# Patient Record
Sex: FEMALE | Race: WHITE | Employment: OTHER | URBAN - METROPOLITAN AREA
[De-identification: names, ages, dates, MRNs, and addresses within clinical notes are randomized per-mention and may not be internally consistent; named-entity substitution may affect disease eponyms.]

---

## 2017-01-25 ENCOUNTER — APPOINTMENT (OUTPATIENT)
Dept: GENERAL RADIOLOGY | Age: 82
DRG: 871 | End: 2017-01-25
Attending: EMERGENCY MEDICINE
Payer: MEDICARE

## 2017-01-25 ENCOUNTER — HOSPITAL ENCOUNTER (INPATIENT)
Age: 82
LOS: 8 days | Discharge: HOSPICE | DRG: 871 | End: 2017-02-02
Attending: EMERGENCY MEDICINE | Admitting: HOSPITALIST
Payer: MEDICARE

## 2017-01-25 DIAGNOSIS — J96.00 ACUTE RESPIRATORY FAILURE, UNSPECIFIED WHETHER WITH HYPOXIA OR HYPERCAPNIA (HCC): ICD-10-CM

## 2017-01-25 DIAGNOSIS — R53.81 DEBILITY: ICD-10-CM

## 2017-01-25 DIAGNOSIS — R78.81 BACTEREMIA: ICD-10-CM

## 2017-01-25 DIAGNOSIS — R06.00 DYSPNEA, UNSPECIFIED TYPE: ICD-10-CM

## 2017-01-25 DIAGNOSIS — A41.9 SEPSIS, DUE TO UNSPECIFIED ORGANISM: ICD-10-CM

## 2017-01-25 DIAGNOSIS — J18.9 PNEUMONIA OF RIGHT LOWER LOBE DUE TO INFECTIOUS ORGANISM: Primary | ICD-10-CM

## 2017-01-25 DIAGNOSIS — N39.0 URINARY TRACT INFECTION WITHOUT HEMATURIA, SITE UNSPECIFIED: ICD-10-CM

## 2017-01-25 DIAGNOSIS — J44.1 COPD EXACERBATION (HCC): ICD-10-CM

## 2017-01-25 PROBLEM — R06.03 RESPIRATORY DISTRESS: Status: ACTIVE | Noted: 2017-01-25

## 2017-01-25 LAB
ALBUMIN SERPL BCP-MCNC: 2.9 G/DL (ref 3.4–5)
ALBUMIN/GLOB SERPL: 0.8 {RATIO} (ref 0.8–1.7)
ALP SERPL-CCNC: 58 U/L (ref 45–117)
ALT SERPL-CCNC: 17 U/L (ref 13–56)
ANION GAP BLD CALC-SCNC: 10 MMOL/L (ref 3–18)
APPEARANCE UR: ABNORMAL
APTT PPP: 20.3 SEC (ref 23–36.4)
AST SERPL W P-5'-P-CCNC: 15 U/L (ref 15–37)
BACTERIA URNS QL MICRO: ABNORMAL /HPF
BASOPHILS # BLD AUTO: 0 K/UL (ref 0–0.06)
BASOPHILS # BLD: 0 % (ref 0–2)
BILIRUB SERPL-MCNC: 0.6 MG/DL (ref 0.2–1)
BILIRUB UR QL: NEGATIVE
BNP SERPL-MCNC: ABNORMAL PG/ML (ref 0–1800)
BUN SERPL-MCNC: 32 MG/DL (ref 7–18)
BUN/CREAT SERPL: 21 (ref 12–20)
CALCIUM SERPL-MCNC: 9.1 MG/DL (ref 8.5–10.1)
CHLORIDE SERPL-SCNC: 102 MMOL/L (ref 100–108)
CK MB CFR SERPL CALC: ABNORMAL % (ref 0–4)
CK MB SERPL-MCNC: <0.5 NG/ML (ref 0.5–3.6)
CK SERPL-CCNC: 40 U/L (ref 26–192)
CO2 SERPL-SCNC: 25 MMOL/L (ref 21–32)
COLOR UR: ABNORMAL
CREAT SERPL-MCNC: 1.55 MG/DL (ref 0.6–1.3)
DIFFERENTIAL METHOD BLD: ABNORMAL
EOSINOPHIL # BLD: 0.1 K/UL (ref 0–0.4)
EOSINOPHIL NFR BLD: 0 % (ref 0–5)
EPITH CASTS URNS QL MICRO: ABNORMAL /LPF (ref 0–5)
ERYTHROCYTE [DISTWIDTH] IN BLOOD BY AUTOMATED COUNT: 13.8 % (ref 11.6–14.5)
EST. AVERAGE GLUCOSE BLD GHB EST-MCNC: 123 MG/DL
GLOBULIN SER CALC-MCNC: 3.7 G/DL (ref 2–4)
GLUCOSE BLD STRIP.AUTO-MCNC: 130 MG/DL (ref 70–110)
GLUCOSE BLD STRIP.AUTO-MCNC: 131 MG/DL (ref 70–110)
GLUCOSE SERPL-MCNC: 144 MG/DL (ref 74–99)
GLUCOSE UR STRIP.AUTO-MCNC: NEGATIVE MG/DL
HBA1C MFR BLD: 5.9 % (ref 4.5–5.6)
HCT VFR BLD AUTO: 40.2 % (ref 35–45)
HGB BLD-MCNC: 13.2 G/DL (ref 12–16)
HGB UR QL STRIP: ABNORMAL
INR PPP: 1.3 (ref 0.8–1.2)
KETONES UR QL STRIP.AUTO: NEGATIVE MG/DL
LACTATE SERPL-SCNC: 1.3 MMOL/L (ref 0.4–2)
LACTATE SERPL-SCNC: 2.1 MMOL/L (ref 0.4–2)
LEUKOCYTE ESTERASE UR QL STRIP.AUTO: ABNORMAL
LYMPHOCYTES # BLD AUTO: 10 % (ref 21–52)
LYMPHOCYTES # BLD: 1.7 K/UL (ref 0.9–3.6)
MCH RBC QN AUTO: 30.1 PG (ref 24–34)
MCHC RBC AUTO-ENTMCNC: 32.8 G/DL (ref 31–37)
MCV RBC AUTO: 91.8 FL (ref 74–97)
MONOCYTES # BLD: 1.2 K/UL (ref 0.05–1.2)
MONOCYTES NFR BLD AUTO: 7 % (ref 3–10)
NEUTS SEG # BLD: 14.3 K/UL (ref 1.8–8)
NEUTS SEG NFR BLD AUTO: 83 % (ref 40–73)
NITRITE UR QL STRIP.AUTO: NEGATIVE
PH UR STRIP: 5 [PH] (ref 5–8)
PLATELET # BLD AUTO: 343 K/UL (ref 135–420)
PMV BLD AUTO: 9.9 FL (ref 9.2–11.8)
POTASSIUM SERPL-SCNC: 4.2 MMOL/L (ref 3.5–5.5)
PROT SERPL-MCNC: 6.6 G/DL (ref 6.4–8.2)
PROT UR STRIP-MCNC: 30 MG/DL
PROTHROMBIN TIME: 15.9 SEC (ref 11.5–15.2)
RBC # BLD AUTO: 4.38 M/UL (ref 4.2–5.3)
RBC #/AREA URNS HPF: 0 /HPF (ref 0–5)
SODIUM SERPL-SCNC: 137 MMOL/L (ref 136–145)
SP GR UR REFRACTOMETRY: 1.02 (ref 1–1.03)
TROPONIN I SERPL-MCNC: <0.02 NG/ML (ref 0–0.06)
UROBILINOGEN UR QL STRIP.AUTO: 0.2 EU/DL (ref 0.2–1)
WBC # BLD AUTO: 17.2 K/UL (ref 4.6–13.2)
WBC URNS QL MICRO: ABNORMAL /HPF (ref 0–5)

## 2017-01-25 PROCEDURE — 83605 ASSAY OF LACTIC ACID: CPT | Performed by: EMERGENCY MEDICINE

## 2017-01-25 PROCEDURE — 96375 TX/PRO/DX INJ NEW DRUG ADDON: CPT

## 2017-01-25 PROCEDURE — 94762 N-INVAS EAR/PLS OXIMTRY CONT: CPT

## 2017-01-25 PROCEDURE — 96361 HYDRATE IV INFUSION ADD-ON: CPT

## 2017-01-25 PROCEDURE — 77030013140 HC MSK NEB VYRM -A

## 2017-01-25 PROCEDURE — 99285 EMERGENCY DEPT VISIT HI MDM: CPT

## 2017-01-25 PROCEDURE — 82550 ASSAY OF CK (CPK): CPT | Performed by: EMERGENCY MEDICINE

## 2017-01-25 PROCEDURE — 74011000258 HC RX REV CODE- 258: Performed by: HOSPITALIST

## 2017-01-25 PROCEDURE — 82962 GLUCOSE BLOOD TEST: CPT

## 2017-01-25 PROCEDURE — 74011250636 HC RX REV CODE- 250/636: Performed by: HOSPITALIST

## 2017-01-25 PROCEDURE — 85025 COMPLETE CBC W/AUTO DIFF WBC: CPT | Performed by: EMERGENCY MEDICINE

## 2017-01-25 PROCEDURE — 74011250636 HC RX REV CODE- 250/636: Performed by: EMERGENCY MEDICINE

## 2017-01-25 PROCEDURE — 80053 COMPREHEN METABOLIC PANEL: CPT | Performed by: EMERGENCY MEDICINE

## 2017-01-25 PROCEDURE — 65270000029 HC RM PRIVATE

## 2017-01-25 PROCEDURE — 87040 BLOOD CULTURE FOR BACTERIA: CPT | Performed by: EMERGENCY MEDICINE

## 2017-01-25 PROCEDURE — 87077 CULTURE AEROBIC IDENTIFY: CPT | Performed by: EMERGENCY MEDICINE

## 2017-01-25 PROCEDURE — 83880 ASSAY OF NATRIURETIC PEPTIDE: CPT | Performed by: EMERGENCY MEDICINE

## 2017-01-25 PROCEDURE — 94640 AIRWAY INHALATION TREATMENT: CPT

## 2017-01-25 PROCEDURE — 77010033678 HC OXYGEN DAILY

## 2017-01-25 PROCEDURE — 74011000258 HC RX REV CODE- 258: Performed by: EMERGENCY MEDICINE

## 2017-01-25 PROCEDURE — 96365 THER/PROPH/DIAG IV INF INIT: CPT

## 2017-01-25 PROCEDURE — 85730 THROMBOPLASTIN TIME PARTIAL: CPT | Performed by: EMERGENCY MEDICINE

## 2017-01-25 PROCEDURE — 83036 HEMOGLOBIN GLYCOSYLATED A1C: CPT | Performed by: HOSPITALIST

## 2017-01-25 PROCEDURE — 74011250637 HC RX REV CODE- 250/637

## 2017-01-25 PROCEDURE — 71010 XR CHEST PORT: CPT

## 2017-01-25 PROCEDURE — 85610 PROTHROMBIN TIME: CPT | Performed by: EMERGENCY MEDICINE

## 2017-01-25 PROCEDURE — 93005 ELECTROCARDIOGRAM TRACING: CPT

## 2017-01-25 PROCEDURE — 74011000250 HC RX REV CODE- 250: Performed by: EMERGENCY MEDICINE

## 2017-01-25 PROCEDURE — 74011000250 HC RX REV CODE- 250: Performed by: HOSPITALIST

## 2017-01-25 PROCEDURE — 87186 SC STD MICRODIL/AGAR DIL: CPT | Performed by: EMERGENCY MEDICINE

## 2017-01-25 PROCEDURE — 81001 URINALYSIS AUTO W/SCOPE: CPT | Performed by: EMERGENCY MEDICINE

## 2017-01-25 PROCEDURE — 74011250637 HC RX REV CODE- 250/637: Performed by: FAMILY MEDICINE

## 2017-01-25 RX ORDER — SODIUM CHLORIDE 50 MG/ML
1 SOLUTION/ DROPS OPHTHALMIC 2 TIMES DAILY
Status: DISCONTINUED | OUTPATIENT
Start: 2017-01-25 | End: 2017-01-25

## 2017-01-25 RX ORDER — HEPARIN SODIUM 5000 [USP'U]/ML
5000 INJECTION, SOLUTION INTRAVENOUS; SUBCUTANEOUS EVERY 8 HOURS
Status: DISCONTINUED | OUTPATIENT
Start: 2017-01-25 | End: 2017-01-30

## 2017-01-25 RX ORDER — ESCITALOPRAM OXALATE 10 MG/1
10 TABLET ORAL DAILY
Status: DISCONTINUED | OUTPATIENT
Start: 2017-01-26 | End: 2017-02-02 | Stop reason: HOSPADM

## 2017-01-25 RX ORDER — LOTEPREDNOL ETABONATE 5 MG/G
1 GEL OPHTHALMIC DAILY
Status: DISCONTINUED | OUTPATIENT
Start: 2017-01-26 | End: 2017-01-28

## 2017-01-25 RX ORDER — ONDANSETRON 2 MG/ML
4 INJECTION INTRAMUSCULAR; INTRAVENOUS
Status: COMPLETED | OUTPATIENT
Start: 2017-01-25 | End: 2017-01-25

## 2017-01-25 RX ORDER — AMOXICILLIN 250 MG
1 CAPSULE ORAL EVERY OTHER DAY
COMMUNITY

## 2017-01-25 RX ORDER — SODIUM CHLORIDE 0.9 % (FLUSH) 0.9 %
5-10 SYRINGE (ML) INJECTION AS NEEDED
Status: DISCONTINUED | OUTPATIENT
Start: 2017-01-25 | End: 2017-02-02 | Stop reason: HOSPADM

## 2017-01-25 RX ORDER — SODIUM CHLORIDE 50 MG/ML
1 SOLUTION/ DROPS OPHTHALMIC 2 TIMES DAILY
Status: DISCONTINUED | OUTPATIENT
Start: 2017-01-25 | End: 2017-02-02 | Stop reason: HOSPADM

## 2017-01-25 RX ORDER — DIFLUPREDNATE 0.5 MG/ML
1 EMULSION OPHTHALMIC
COMMUNITY

## 2017-01-25 RX ORDER — ASPIRIN 81 MG/1
81 TABLET ORAL DAILY
COMMUNITY
End: 2017-02-02

## 2017-01-25 RX ORDER — DIFLUPREDNATE 0.5 MG/ML
1 EMULSION OPHTHALMIC
Status: DISCONTINUED | OUTPATIENT
Start: 2017-01-31 | End: 2017-02-01 | Stop reason: ALTCHOICE

## 2017-01-25 RX ORDER — IPRATROPIUM BROMIDE AND ALBUTEROL SULFATE 2.5; .5 MG/3ML; MG/3ML
3 SOLUTION RESPIRATORY (INHALATION)
Status: DISCONTINUED | OUTPATIENT
Start: 2017-01-25 | End: 2017-02-02 | Stop reason: HOSPADM

## 2017-01-25 RX ORDER — MAGNESIUM SULFATE 100 %
4 CRYSTALS MISCELLANEOUS AS NEEDED
Status: DISCONTINUED | OUTPATIENT
Start: 2017-01-25 | End: 2017-01-30

## 2017-01-25 RX ORDER — OLOPATADINE HYDROCHLORIDE 2 MG/ML
1 SOLUTION/ DROPS OPHTHALMIC DAILY
COMMUNITY
End: 2017-02-02

## 2017-01-25 RX ORDER — INSULIN LISPRO 100 [IU]/ML
INJECTION, SOLUTION INTRAVENOUS; SUBCUTANEOUS
Status: DISCONTINUED | OUTPATIENT
Start: 2017-01-25 | End: 2017-01-30

## 2017-01-25 RX ORDER — CARVEDILOL 12.5 MG/1
12.5 TABLET ORAL 2 TIMES DAILY WITH MEALS
Status: DISCONTINUED | OUTPATIENT
Start: 2017-01-25 | End: 2017-02-02 | Stop reason: HOSPADM

## 2017-01-25 RX ORDER — DOXYLAMINE SUCCINATE 25 MG
TABLET ORAL 2 TIMES DAILY
COMMUNITY

## 2017-01-25 RX ORDER — IPRATROPIUM BROMIDE AND ALBUTEROL SULFATE 2.5; .5 MG/3ML; MG/3ML
3 SOLUTION RESPIRATORY (INHALATION)
Status: DISCONTINUED | OUTPATIENT
Start: 2017-01-25 | End: 2017-01-26 | Stop reason: SDUPTHER

## 2017-01-25 RX ORDER — FLUOCINONIDE TOPICAL SOLUTION USP, 0.05% 0.5 MG/ML
SOLUTION TOPICAL 2 TIMES DAILY
COMMUNITY

## 2017-01-25 RX ORDER — ACETAMINOPHEN 650 MG/1
650 SUPPOSITORY RECTAL
Status: COMPLETED | OUTPATIENT
Start: 2017-01-25 | End: 2017-01-25

## 2017-01-25 RX ORDER — DIFLUPREDNATE 0.5 MG/ML
1 EMULSION OPHTHALMIC 3 TIMES DAILY
Status: DISCONTINUED | OUTPATIENT
Start: 2017-01-25 | End: 2017-02-02 | Stop reason: HOSPADM

## 2017-01-25 RX ORDER — CHOLECALCIFEROL (VITAMIN D3) 125 MCG
2000 CAPSULE ORAL DAILY
COMMUNITY
End: 2017-02-02

## 2017-01-25 RX ORDER — ACETAMINOPHEN 325 MG/1
650 TABLET ORAL
Status: DISCONTINUED | OUTPATIENT
Start: 2017-01-25 | End: 2017-02-02 | Stop reason: HOSPADM

## 2017-01-25 RX ORDER — IPRATROPIUM BROMIDE AND ALBUTEROL SULFATE 2.5; .5 MG/3ML; MG/3ML
3 SOLUTION RESPIRATORY (INHALATION) ONCE
Status: COMPLETED | OUTPATIENT
Start: 2017-01-25 | End: 2017-01-25

## 2017-01-25 RX ORDER — BUSPIRONE HYDROCHLORIDE 15 MG/1
15 TABLET ORAL 2 TIMES DAILY
COMMUNITY

## 2017-01-25 RX ORDER — DEXTROSE 50 % IN WATER (D50W) INTRAVENOUS SYRINGE
25-50 AS NEEDED
Status: DISCONTINUED | OUTPATIENT
Start: 2017-01-25 | End: 2017-01-30

## 2017-01-25 RX ORDER — LOTEPREDNOL ETABONATE 5 MG/G
1 GEL OPHTHALMIC DAILY
COMMUNITY

## 2017-01-25 RX ORDER — CHOLECALCIFEROL (VITAMIN D3) 125 MCG
5 CAPSULE ORAL
COMMUNITY

## 2017-01-25 RX ORDER — LEVOFLOXACIN 5 MG/ML
500 INJECTION, SOLUTION INTRAVENOUS EVERY 24 HOURS
Status: DISCONTINUED | OUTPATIENT
Start: 2017-01-25 | End: 2017-01-26 | Stop reason: DRUGHIGH

## 2017-01-25 RX ORDER — DIFLUPREDNATE 0.5 MG/ML
1 EMULSION OPHTHALMIC 3 TIMES DAILY
COMMUNITY

## 2017-01-25 RX ORDER — SODIUM CHLORIDE 9 MG/ML
40 INJECTION, SOLUTION INTRAVENOUS CONTINUOUS
Status: DISCONTINUED | OUTPATIENT
Start: 2017-01-25 | End: 2017-01-30

## 2017-01-25 RX ORDER — BENZONATATE 100 MG/1
100 CAPSULE ORAL
COMMUNITY
End: 2017-02-02

## 2017-01-25 RX ORDER — ACETAMINOPHEN 650 MG/1
SUPPOSITORY RECTAL
Status: COMPLETED
Start: 2017-01-25 | End: 2017-01-25

## 2017-01-25 RX ORDER — SODIUM CHLORIDE 0.9 % (FLUSH) 0.9 %
5-10 SYRINGE (ML) INJECTION EVERY 8 HOURS
Status: DISCONTINUED | OUTPATIENT
Start: 2017-01-25 | End: 2017-02-02 | Stop reason: HOSPADM

## 2017-01-25 RX ORDER — ESCITALOPRAM OXALATE 10 MG/1
10 TABLET ORAL DAILY
COMMUNITY
End: 2017-02-02

## 2017-01-25 RX ORDER — ASPIRIN 81 MG/1
81 TABLET ORAL DAILY
Status: DISCONTINUED | OUTPATIENT
Start: 2017-01-26 | End: 2017-02-02 | Stop reason: HOSPADM

## 2017-01-25 RX ORDER — PILOCARPINE HYDROCHLORIDE 10 MG/ML
1 SOLUTION/ DROPS OPHTHALMIC EVERY 6 HOURS
COMMUNITY
End: 2017-02-02

## 2017-01-25 RX ORDER — FLUTICASONE PROPIONATE 50 MCG
2 SPRAY, SUSPENSION (ML) NASAL DAILY
COMMUNITY
End: 2017-02-02

## 2017-01-25 RX ORDER — KETOCONAZOLE 20 MG/ML
SHAMPOO TOPICAL 2 TIMES WEEKLY
COMMUNITY
End: 2017-02-02

## 2017-01-25 RX ORDER — OLOPATADINE HYDROCHLORIDE 2 MG/ML
1 SOLUTION/ DROPS OPHTHALMIC DAILY
Status: DISCONTINUED | OUTPATIENT
Start: 2017-01-26 | End: 2017-02-01 | Stop reason: ALTCHOICE

## 2017-01-25 RX ORDER — BRINZOLAMIDE 10 MG/ML
1 SUSPENSION/ DROPS OPHTHALMIC 3 TIMES DAILY
Status: DISCONTINUED | OUTPATIENT
Start: 2017-01-25 | End: 2017-02-02 | Stop reason: HOSPADM

## 2017-01-25 RX ORDER — ATORVASTATIN CALCIUM 10 MG/1
10 TABLET, FILM COATED ORAL DAILY
Status: DISCONTINUED | OUTPATIENT
Start: 2017-01-26 | End: 2017-01-30

## 2017-01-25 RX ORDER — OLOPATADINE HYDROCHLORIDE 2 MG/ML
1 SOLUTION/ DROPS OPHTHALMIC DAILY
Status: DISCONTINUED | OUTPATIENT
Start: 2017-01-26 | End: 2017-01-25

## 2017-01-25 RX ORDER — PILOCARPINE HYDROCHLORIDE 10 MG/ML
1 SOLUTION/ DROPS OPHTHALMIC EVERY 6 HOURS
Status: DISCONTINUED | OUTPATIENT
Start: 2017-01-25 | End: 2017-02-01 | Stop reason: ALTCHOICE

## 2017-01-25 RX ORDER — SODIUM CHLORIDE 50 MG/ML
1 SOLUTION/ DROPS OPHTHALMIC 2 TIMES DAILY
COMMUNITY

## 2017-01-25 RX ADMIN — BRIMONIDINE TARTRATE 1 DROP: 1 SOLUTION/ DROPS OPHTHALMIC at 23:02

## 2017-01-25 RX ADMIN — ACETAMINOPHEN 650 MG: 650 SUPPOSITORY RECTAL at 09:36

## 2017-01-25 RX ADMIN — SODIUM CHLORIDE 1 DROP: 50 SOLUTION OPHTHALMIC at 23:00

## 2017-01-25 RX ADMIN — METHYLPREDNISOLONE SODIUM SUCCINATE 125 MG: 125 INJECTION, POWDER, FOR SOLUTION INTRAMUSCULAR; INTRAVENOUS at 09:43

## 2017-01-25 RX ADMIN — DIFLUPREDNATE 1 DROP: 0.5 EMULSION OPHTHALMIC at 22:00

## 2017-01-25 RX ADMIN — HEPARIN SODIUM 5000 UNITS: 5000 INJECTION, SOLUTION INTRAVENOUS; SUBCUTANEOUS at 17:23

## 2017-01-25 RX ADMIN — IPRATROPIUM BROMIDE AND ALBUTEROL SULFATE 3 ML: .5; 3 SOLUTION RESPIRATORY (INHALATION) at 10:26

## 2017-01-25 RX ADMIN — ONDANSETRON 4 MG: 2 INJECTION INTRAMUSCULAR; INTRAVENOUS at 09:43

## 2017-01-25 RX ADMIN — PIPERACILLIN AND TAZOBACTAM 3.38 G: 3; .375 INJECTION, POWDER, FOR SOLUTION INTRAVENOUS at 15:20

## 2017-01-25 RX ADMIN — Medication 10 ML: at 15:20

## 2017-01-25 RX ADMIN — SODIUM CHLORIDE 150 ML/HR: 900 INJECTION, SOLUTION INTRAVENOUS at 12:15

## 2017-01-25 RX ADMIN — SODIUM CHLORIDE 500 ML: 900 INJECTION, SOLUTION INTRAVENOUS at 12:14

## 2017-01-25 RX ADMIN — PIPERACILLIN AND TAZOBACTAM 3.38 G: 3; .375 INJECTION, POWDER, FOR SOLUTION INTRAVENOUS at 22:38

## 2017-01-25 RX ADMIN — BRINZOLAMIDE 1 DROP: 10 SUSPENSION/ DROPS OPHTHALMIC at 23:04

## 2017-01-25 RX ADMIN — BIMATOPROST 1 DROP: 0.1 SOLUTION/ DROPS OPHTHALMIC at 23:00

## 2017-01-25 RX ADMIN — CEFTRIAXONE 1 G: 1 INJECTION, POWDER, FOR SOLUTION INTRAMUSCULAR; INTRAVENOUS at 09:56

## 2017-01-25 RX ADMIN — METHYLPREDNISOLONE SODIUM SUCCINATE 40 MG: 40 INJECTION, POWDER, FOR SOLUTION INTRAMUSCULAR; INTRAVENOUS at 17:23

## 2017-01-25 RX ADMIN — LEVOFLOXACIN 500 MG: 5 INJECTION, SOLUTION INTRAVENOUS at 17:24

## 2017-01-25 RX ADMIN — Medication 5 ML: at 22:00

## 2017-01-25 RX ADMIN — ACETAMINOPHEN 650 MG: 325 TABLET, FILM COATED ORAL at 22:38

## 2017-01-25 RX ADMIN — SODIUM CHLORIDE 75 ML/HR: 900 INJECTION, SOLUTION INTRAVENOUS at 21:18

## 2017-01-25 RX ADMIN — Medication 10 ML: at 12:17

## 2017-01-25 NOTE — ED TRIAGE NOTES
Patient is a resident of [facility]. Patient transported to THE [facility] ED via [facility]. Patient was having shortness of breath has been diagnosed with pneumonia. EMS reports sats were 86% and placed on NC 4LPM via NC. Patient states she has a cough and is verbal and talking to MD.   Sepsis Screening completed    ( x )Patient meets SIRS criteria. (  )Patient does not meet SIRS criteria.       SIRS Criteria is achieved when two or more of the following are present   Temperature < 96.8°F (36°C) or > 100.9°F (38.3°C)   Heart Rate > 90 beats per minute   Respiratory Rate > 20 beats per minute   WBC count > 12,000 or <4,000 or > 10% bands

## 2017-01-25 NOTE — ED NOTES
Bedside report complete. Patient was introduced to oncsamantha Wilkins RN. Patient updated on plan of care. Safety check performed: Bed locked in low posiiton. Side rails up. Call bell and personal items within reach.

## 2017-01-25 NOTE — ED PROVIDER NOTES
HPI Comments: 9:12 AM  Marie Eng is a 80 y.o. female presenting to the ED with hx of COPD c/o SOB x 2 days. Per EMS, pt is from [facility], and PA there has dx pt with right lower lobe PNA. Pt has been given multiple breathing txs at her care facility with no relief. EMS reports there are multiple pt's at this care facility with diagnosed PNA. Pt normally uses O2 prn and was 86 % on room air. With nasal canula she is 96%. Pt reports associated sxs include leg pain, productive cough, chest pressure, and nausea. Pt reports she can stand and transfer to a chair, but is not ambulatory. Other PMHx include A-Fib, DM, CAD, and asthma. Denies any other sxs or complaints. Patient is a 80 y.o. female presenting with shortness of breath. The history is provided by the patient. Shortness of Breath   This is a new problem. The current episode started 2 days ago. Associated symptoms include cough, sputum production and chest pain. Pertinent negatives include no vomiting. She has tried inhaled steroids and ipratropium inhalers for the symptoms. The treatment provided no relief. Associated medical issues include asthma, COPD, pneumonia and CAD. Past Medical History:   Diagnosis Date    Adhesive capsulitis     Anemia     Anxiety     Arthritis     Asthma     Atrial fibrillation (HCC)     CAD (coronary artery disease)     COPD     Diabetes (HCC)     Diverticulosis     GERD (gastroesophageal reflux disease)     Hypertension     Osteoporosis     Peripheral neuropathy ([address]     Psychiatric disorder      depression    Thromboembolus ([address]     Unspecified sleep apnea      no longer wears CPAP.        Past Surgical History:   Procedure Laterality Date    Hx colectomy      Hx appendectomy      Hx gyn       hysterectomy    Hx heent       tonsillectomy    Hx hemorrhoidectomy      Hx corneal transplant           Family History:   Problem Relation Age of Onset    Heart Disease Mother     Cancer Father     Cancer Sister        Social History     Social History    Marital status:      Spouse name: N/A    Number of children: N/A    Years of education: N/A     Occupational History    Not on file. Social History Main Topics    Smoking status: Never Smoker    Smokeless tobacco: Never Used    Alcohol use No    Drug use: No    Sexual activity: Not on file     Other Topics Concern    Not on file     Social History Narrative         ALLERGIES: Latex and Codeine    Review of Systems   Respiratory: Positive for cough, sputum production and shortness of breath. Cardiovascular: Positive for chest pain. Gastrointestinal: Positive for nausea. Negative for vomiting. Musculoskeletal: Positive for myalgias. All other systems reviewed and are negative. Vitals:    01/25/17 1028 01/25/17 1100 01/25/17 1230 01/25/17 1240   BP:  98/62 103/51 97/47   Pulse:  63 91 (!) 109   Resp:  28 (!) 33 30   Temp:       SpO2: (!) 88% 95% 94% 95%   Weight:       Height:                Physical Exam   Constitutional: She is oriented to person, place, and time. She appears well-developed and well-nourished. She appears ill. No distress. Elderly female. Appears chronically ill. In NAD. HENT:   Head: Normocephalic and atraumatic. Mouth/Throat: Mucous membranes are dry. Dry with dry lips   Eyes: Pupils are equal, round, and reactive to light. Neck: Neck supple. Cardiovascular: Normal rate, regular rhythm, S1 normal, S2 normal and normal heart sounds. Pulmonary/Chest: No respiratory distress. She has wheezes (diffuse faint expiratory wheezes). She has rhonchi (mostly right sided). She exhibits no tenderness. Abdominal: Soft. She exhibits no distension and no mass. There is no tenderness. There is no guarding. Musculoskeletal: Normal range of motion. She exhibits no edema or tenderness.    Chronic joint changes of fingers and toes   Neurological: She is alert and oriented to person, place, and time. No cranial nerve deficit. CN II-XII intact     Skin: No rash noted. Psychiatric: She has a normal mood and affect. Her behavior is normal. Thought content normal.   Nursing note and vitals reviewed. RESULTS:    EKG interpretation: (Preliminary)  Rate 120 bpm. Electronic ventricular pacemaker. EKG read by Eric Zavala MD at 9:37 AM    10:28 AM  RADIOLOGY FINDINGS  Chest X-ray shows PNA in the right lower lobe  Pending review by Radiologist  Recorded by Anuj Cox ED Scribe, as dictated by Eric Zavala MD    XR CHEST PORT   Final Result   Impression:      1. Increasing consolidation right lower lobe with small right effusion  2. Scar in the left lung unchanged. 3. Patient's chin obscures the right apex but no pneumothorax. 4. Degenerative changes which appear severe involving both shoulders.     As read by the radiologist.          Labs Reviewed   CARDIAC PANEL,(CK, CKMB & TROPONIN) - Abnormal; Notable for the following:        Result Value    CK - MB <0.5 (*)     All other components within normal limits   CBC WITH AUTOMATED DIFF - Abnormal; Notable for the following:     WBC 17.2 (*)     NEUTROPHILS 83 (*)     LYMPHOCYTES 10 (*)     ABS.  NEUTROPHILS 14.3 (*)     All other components within normal limits   METABOLIC PANEL, COMPREHENSIVE - Abnormal; Notable for the following:     Glucose 144 (*)     BUN 32 (*)     Creatinine 1.55 (*)     BUN/Creatinine ratio 21 (*)     GFR est AA 38 (*)     GFR est non-AA 31 (*)     Albumin 2.9 (*)     All other components within normal limits   LACTIC ACID, PLASMA - Abnormal; Notable for the following:     Lactic acid 2.1 (*)     All other components within normal limits   PRO-BNP - Abnormal; Notable for the following:     NT pro-BNP 75242 (*)     All other components within normal limits   PROTHROMBIN TIME + INR - Abnormal; Notable for the following:     Prothrombin time 15.9 (*)     INR 1.3 (*)     All other components within normal limits PTT - Abnormal; Notable for the following:     aPTT 20.3 (*)     All other components within normal limits   URINALYSIS W/ RFLX MICROSCOPIC - Abnormal; Notable for the following:     Protein 30 (*)     Blood TRACE (*)     Leukocyte Esterase LARGE (*)     All other components within normal limits   URINE MICROSCOPIC ONLY - Abnormal; Notable for the following:     Bacteria 1+ (*)     All other components within normal limits   CULTURE, BLOOD       Recent Results (from the past 12 hour(s))   EKG, 12 LEAD, INITIAL    Collection Time: 01/25/17  9:37 AM   Result Value Ref Range    Ventricular Rate 120 BPM    Atrial Rate 122 BPM    QRS Duration 176 ms    Q-T Interval 426 ms    QTC Calculation (Bezet) 602 ms    Calculated R Axis -80 degrees    Calculated T Axis 89 degrees    Diagnosis       Electronic ventricular pacemaker  When compared with ECG of 21-AUG-2015 15:29,  Electronic ventricular pacemaker has replaced Sinus rhythm     CARDIAC PANEL,(CK, CKMB & TROPONIN)    Collection Time: 01/25/17  9:40 AM   Result Value Ref Range    CK 40 26 - 192 U/L    CK - MB <0.5 (L) 0.5 - 3.6 ng/ml    CK-MB Index CANNOT BE CALCULATED 0.0 - 4.0 %    Troponin-I, Qt. <0.02 0.00 - 0.06 NG/ML   CBC WITH AUTOMATED DIFF    Collection Time: 01/25/17  9:40 AM   Result Value Ref Range    WBC 17.2 (H) 4.6 - 13.2 K/uL    RBC 4.38 4.20 - 5.30 M/uL    HGB 13.2 12.0 - 16.0 g/dL    HCT 40.2 35.0 - 45.0 %    MCV 91.8 74.0 - 97.0 FL    MCH 30.1 24.0 - 34.0 PG    MCHC 32.8 31.0 - 37.0 g/dL    RDW 13.8 11.6 - 14.5 %    PLATELET 247 363 - 291 K/uL    MPV 9.9 9.2 - 11.8 FL    NEUTROPHILS 83 (H) 40 - 73 %    LYMPHOCYTES 10 (L) 21 - 52 %    MONOCYTES 7 3 - 10 %    EOSINOPHILS 0 0 - 5 %    BASOPHILS 0 0 - 2 %    ABS. NEUTROPHILS 14.3 (H) 1.8 - 8.0 K/UL    ABS. LYMPHOCYTES 1.7 0.9 - 3.6 K/UL    ABS. MONOCYTES 1.2 0.05 - 1.2 K/UL    ABS. EOSINOPHILS 0.1 0.0 - 0.4 K/UL    ABS.  BASOPHILS 0.0 0.0 - 0.06 K/UL    DF AUTOMATED     METABOLIC PANEL, COMPREHENSIVE Collection Time: 01/25/17  9:40 AM   Result Value Ref Range    Sodium 137 136 - 145 mmol/L    Potassium 4.2 3.5 - 5.5 mmol/L    Chloride 102 100 - 108 mmol/L    CO2 25 21 - 32 mmol/L    Anion gap 10 3.0 - 18 mmol/L    Glucose 144 (H) 74 - 99 mg/dL    BUN 32 (H) 7.0 - 18 MG/DL    Creatinine 1.55 (H) 0.6 - 1.3 MG/DL    BUN/Creatinine ratio 21 (H) 12 - 20      GFR est AA 38 (L) >60 ml/min/1.73m2    GFR est non-AA 31 (L) >60 ml/min/1.73m2    Calcium 9.1 8.5 - 10.1 MG/DL    Bilirubin, total 0.6 0.2 - 1.0 MG/DL    ALT 17 13 - 56 U/L    AST 15 15 - 37 U/L    Alk.  phosphatase 58 45 - 117 U/L    Protein, total 6.6 6.4 - 8.2 g/dL    Albumin 2.9 (L) 3.4 - 5.0 g/dL    Globulin 3.7 2.0 - 4.0 g/dL    A-G Ratio 0.8 0.8 - 1.7     LACTIC ACID, PLASMA    Collection Time: 01/25/17  9:40 AM   Result Value Ref Range    Lactic acid 2.1 (HH) 0.4 - 2.0 MMOL/L   PRO-BNP    Collection Time: 01/25/17  9:40 AM   Result Value Ref Range    NT pro-BNP 31762 (H) 0 - 1800 PG/ML   PROTHROMBIN TIME + INR    Collection Time: 01/25/17  9:40 AM   Result Value Ref Range    Prothrombin time 15.9 (H) 11.5 - 15.2 sec    INR 1.3 (H) 0.8 - 1.2     PTT    Collection Time: 01/25/17  9:40 AM   Result Value Ref Range    aPTT 20.3 (L) 23.0 - 36.4 SEC   URINALYSIS W/ RFLX MICROSCOPIC    Collection Time: 01/25/17  9:47 AM   Result Value Ref Range    Color DARK YELLOW      Appearance CLOUDY      Specific gravity 1.021 1.005 - 1.030      pH (UA) 5.0 5.0 - 8.0      Protein 30 (A) NEG mg/dL    Glucose NEGATIVE  NEG mg/dL    Ketone NEGATIVE  NEG mg/dL    Bilirubin NEGATIVE  NEG      Blood TRACE (A) NEG      Urobilinogen 0.2 0.2 - 1.0 EU/dL    Nitrites NEGATIVE  NEG      Leukocyte Esterase LARGE (A) NEG     URINE MICROSCOPIC ONLY    Collection Time: 01/25/17  9:47 AM   Result Value Ref Range    WBC 10 to 20 0 - 5 /hpf    RBC 0 0 - 5 /hpf    Epithelial cells FEW 0 - 5 /lpf    Bacteria 1+ (A) NEG /hpf       MDM  Number of Diagnoses or Management Options  Diagnosis management comments: INITIAL CLINICAL IMPRESSION and PLANS:  The patient presents with the primary complaint(s) of: SOB. The presentation, to include historical aspects and clinical findings are consistent with the DX of COPD Exacerbation. However, other possible DX's to consider as primary, associated with, or exacerbated by include:    1. PNA  2. UTI  3. Sepsis  4. Viral Illness  5. CHF    Considering the above, my initial management plan to evaluate and therapeutic interventions include the following and as noted in the orders:    1. Cardiac Panel, CBC, Blood culture, CMP, Lactic Acid, Pro-BNP, PT INR, PTT, UA  2. EKG  3. CXR    Recorded by Jenni Cárdenas ED Scribcielo, as dictated by Daniel Sharpe MD.         Amount and/or Complexity of Data Reviewed  Clinical lab tests: ordered and reviewed  Tests in the radiology section of CPT®: ordered and reviewed (CXR)  Tests in the medicine section of CPT®: ordered and reviewed (EKG)  Independent visualization of images, tracings, or specimens: yes (EKG, CXR)      ED Course     MEDICATIONS GIVEN:  Medications   sodium chloride (NS) flush 5-10 mL (10 mL IntraVENous Given 1/25/17 1217)   sodium chloride (NS) flush 5-10 mL (not administered)   0.9% sodium chloride infusion (150 mL/hr IntraVENous New Bag 1/25/17 1215)   methylPREDNISolone (PF) (SOLU-MEDROL) injection 125 mg (125 mg IntraVENous Given 1/25/17 0943)   ondansetron (ZOFRAN) injection 4 mg (4 mg IntraVENous Given 1/25/17 0943)   acetaminophen (TYLENOL) suppository 650 mg (650 mg Rectal Given 1/25/17 0936)   cefTRIAXone (ROCEPHIN) 1 g in 0.9% sodium chloride (MBP/ADV) 50 mL MBP (0 g IntraVENous IV Completed 1/25/17 1036)   albuterol-ipratropium (DUO-NEB) 2.5 MG-0.5 MG/3 ML (3 mL Nebulization Given 1/25/17 1026)   sodium chloride 0.9 % bolus infusion 500 mL (500 mL IntraVENous New Bag 1/25/17 1214)       Procedures    PROGRESS NOTE:  9:12 AM  Initial assessment performed.      PROGRESS NOTE:   11:45 AM  Discussed pt with her daughter who is at bedside. Daughter is aware pt has PNA, UTI, and sepsis. Pt was DNR on last admission to the hospital, I discussed this with daughter who advise continue DNR. No intubation, no aggressive measures. We also discussed possibility of septic measures including aggressive hydration with possibility of intubation if pt goes into failure and daughter was against this. She agrees with pt being admitted, getting abx, Oxygen, and any other comfort measures. Daughter is aware that there are no hospital beds and she denies transfer. SEPSIS ASSESSMENT NOTE:   12:01 PM  Dolores Fontenot MD has seen and assessed the patient as follows:    Capillary Refill:normal/brisk  Cardiopulmonary Evaluation:   Lung Sounds: wheezing and rhonchi   Cardiac Sounds: regular and rhythm ______  Peripheral Pulses: present  Skin Exam: pink and turgor poor    Visit Vitals    BP 97/47    Pulse (!) 109    Temp (!) 102.6 °F (39.2 °C)    Resp 30    Ht 5' 6\" (1.676 m)    Wt 80.7 kg (178 lb)    SpO2 95%    BMI 28.73 kg/m2       Written by DERIC Buck, as dictated by Molly Smith MD     CONSULT NOTE:   12:55 PM  Molly Smith MD spoke with Clover Griggs MD   Specialty: Internal Medicine, Hospitalist group  Discussed pt's hx, disposition, and available diagnostic and imaging results. Reviewed care plans. Consulting physician agrees to admit pt to medical floor. ADMISSION NOTE:  12:55 PM  Patient is being admitted to the hospital by Clover Griggs MD to the medical floor. The results of their tests and reasons for their admission have been discussed with them and/or available family. They convey agreement and understanding for the need to be admitted and for their admission diagnosis. CONDITIONS ON ADMISSION:  Deep Vein Thrombosis is not present at the time of admission. Thrombosis is not present at the time of admission.  Urinary Tract Infection is present at the time of admission. Pneumonia is present at the time of admission. MRSA is present at the time of admission. Wound infection is not present at the time of admission. Pressure Ulcer is not present at the time of admission. CLINICAL IMPRESSION    1. Pneumonia of right lower lobe due to infectious organism    2. Sepsis, due to unspecified organism ([facility] [address]    3. COPD exacerbation ([facility] [address]    4. Urinary tract infection without hematuria, site unspecified      12:55 PM  I have spent 45 minutes of critical care time involved in lab review, consultations with specialist, family decision-making, and documentation. During this entire length of time I was immediately available to the patient. Critical Care: The reason for providing this level of medical care for this critically ill patient was due a critical illness that impaired one or more vital organ systems such that there was a high probability of imminent or life threatening deterioration in the patients condition. This care involved high complexity decision making to assess, manipulate, and support vital system functions, to treat this degreee vital organ system failure and to prevent further life threatening deterioration of the patients condition. SCRIBE ATTESTATION STATEMENT  Documented by:Aga Veliz for and in the presence of Rosemary Cruz MD.     PROVIDER ATTESTATION STATEMENT  I personally performed the services described in the documentation, reviewed the documentation, as recorded by the scribe in my presence, and it accurately and completely records my words and actions.   Rosemary Cruz MD.

## 2017-01-25 NOTE — ED NOTES
Dr. Barbara Nath is aware of patient + for sirs and elevated lactic and he will look at chest xray to evaluate fluids.

## 2017-01-25 NOTE — PROGRESS NOTES
PTA eye drops taken to [facility] pharmacy. Administered Retaine MGD EMU 0.5-0.5% prior to taking meds to pharmacy. Glaucoma and cornea transplant bilateral. Right eye has increased pressure with glaucoma according to pt. Right eye has cornea transplant under physician supervision for potential problem.   [facility]  [phone] [facility])

## 2017-01-25 NOTE — ED NOTES
Patient coughing up thick yellow and gray secretions. Mouth was dry and crusty RNdid oral care and patient using yaunkeur to do own oral suctioning per her request. sats 87-90% on NC 4lpm Dr. Hdz Artist made aware continue with present plan of care.

## 2017-01-25 NOTE — ED NOTES
TRANSFER - OUT REPORT:    Verbal report given to Summer Arroyo) on [facility]  being transferred to medical 310(unit) for routine progression of care       Report consisted of patients Situation, Background, Assessment and   Recommendations(SBAR). Information from the following report(s) SBAR, ED Summary, [facility] and Recent Results was reviewed with the receiving nurse. Lines:   Peripheral IV 01/25/17 Left Antecubital (Active)       Peripheral IV 01/25/17 Left Antecubital (Active)   Site Assessment Clean, dry, & intact 1/25/2017  9:55 AM   Phlebitis Assessment 0 1/25/2017  9:55 AM   Infiltration Assessment 0 1/25/2017  9:55 AM   Dressing Status Clean, dry, & intact 1/25/2017  9:55 AM   Dressing Type Transparent 1/25/2017  9:55 AM        Opportunity for questions and clarification was provided.       Patient transported with:   [employer]

## 2017-01-25 NOTE — ED NOTES
Bedside report complete. Patient was introduced to maria alejandra Sewell RN. Patient updated on plan of care. Safety check performed: Bed locked in low posiiton. Side rails up. Call bell and personal items within reach. Handoff Sepsis Algorithm reviewed:    Time Blood Cultures Drawn      Time Initial Lactic Drawn:     Result:  Time Next Lactic Acid Due:  Time target fluid bolus was initiated:  Time antibiotics started:    Sepsis Screening completed  (  )Patient meets SIRS criteria. (  )Patient does not meet SIRS criteria.     SIRS Criteria is achieved when two or more of the following are present   Temperature < 96.8°F (36°C) or > 100.9°F (38.3°C)   Heart Rate > 90 beats per minute   Respiratory Rate > 20 beats per minute   WBC count > 12,000 or <4,000 or > 10% bands    Time Severe Sepsis Met:  Severe Sepsis =  SIRS Criteria + Source of Infection + Organ Dysfunction   Organ Dysfunction is met when the patient has new onset:   SBP<90 or MAP<65 or decrease in SBP more than 40 points from baseline   Bilirubin>2   INR>1.5 or aPTT>60   Creatinine>2 or UO<0.5 ml/kg/hr for 2 hours   Platelet count < 291,480   Lactate >2   Acute Respiratory Failure   (BiPap/CPAP/Ventilator)

## 2017-01-26 PROBLEM — R78.81 BACTEREMIA: Status: ACTIVE | Noted: 2017-01-26

## 2017-01-26 LAB
ANION GAP BLD CALC-SCNC: 12 MMOL/L (ref 3–18)
ATRIAL RATE: 122 BPM
BUN SERPL-MCNC: 44 MG/DL (ref 7–18)
BUN/CREAT SERPL: 24 (ref 12–20)
CALCIUM SERPL-MCNC: 8.5 MG/DL (ref 8.5–10.1)
CALCULATED R AXIS, ECG10: -80 DEGREES
CALCULATED T AXIS, ECG11: 89 DEGREES
CHLORIDE SERPL-SCNC: 104 MMOL/L (ref 100–108)
CO2 SERPL-SCNC: 23 MMOL/L (ref 21–32)
CREAT SERPL-MCNC: 1.87 MG/DL (ref 0.6–1.3)
DIAGNOSIS, 93000: NORMAL
ERYTHROCYTE [DISTWIDTH] IN BLOOD BY AUTOMATED COUNT: 14.2 % (ref 11.6–14.5)
GLUCOSE BLD STRIP.AUTO-MCNC: 115 MG/DL (ref 70–110)
GLUCOSE BLD STRIP.AUTO-MCNC: 119 MG/DL (ref 70–110)
GLUCOSE BLD STRIP.AUTO-MCNC: 132 MG/DL (ref 70–110)
GLUCOSE BLD STRIP.AUTO-MCNC: 136 MG/DL (ref 70–110)
GLUCOSE BLD STRIP.AUTO-MCNC: 172 MG/DL (ref 70–110)
GLUCOSE SERPL-MCNC: 131 MG/DL (ref 74–99)
HCT VFR BLD AUTO: 35.5 % (ref 35–45)
HGB BLD-MCNC: 11.4 G/DL (ref 12–16)
MCH RBC QN AUTO: 29.8 PG (ref 24–34)
MCHC RBC AUTO-ENTMCNC: 32.1 G/DL (ref 31–37)
MCV RBC AUTO: 92.7 FL (ref 74–97)
PLATELET # BLD AUTO: 247 K/UL (ref 135–420)
PMV BLD AUTO: 9.6 FL (ref 9.2–11.8)
POTASSIUM SERPL-SCNC: 4.8 MMOL/L (ref 3.5–5.5)
Q-T INTERVAL, ECG07: 426 MS
QRS DURATION, ECG06: 176 MS
QTC CALCULATION (BEZET), ECG08: 602 MS
RBC # BLD AUTO: 3.83 M/UL (ref 4.2–5.3)
SODIUM SERPL-SCNC: 139 MMOL/L (ref 136–145)
VENTRICULAR RATE, ECG03: 120 BPM
WBC # BLD AUTO: 12.9 K/UL (ref 4.6–13.2)

## 2017-01-26 PROCEDURE — 74011000250 HC RX REV CODE- 250: Performed by: HOSPITALIST

## 2017-01-26 PROCEDURE — 74011250636 HC RX REV CODE- 250/636: Performed by: HOSPITALIST

## 2017-01-26 PROCEDURE — 82962 GLUCOSE BLOOD TEST: CPT

## 2017-01-26 PROCEDURE — 36415 COLL VENOUS BLD VENIPUNCTURE: CPT | Performed by: HOSPITALIST

## 2017-01-26 PROCEDURE — 77010033678 HC OXYGEN DAILY

## 2017-01-26 PROCEDURE — 87186 SC STD MICRODIL/AGAR DIL: CPT | Performed by: HOSPITALIST

## 2017-01-26 PROCEDURE — 74011250637 HC RX REV CODE- 250/637: Performed by: HOSPITALIST

## 2017-01-26 PROCEDURE — 80048 BASIC METABOLIC PNL TOTAL CA: CPT | Performed by: HOSPITALIST

## 2017-01-26 PROCEDURE — 87077 CULTURE AEROBIC IDENTIFY: CPT | Performed by: HOSPITALIST

## 2017-01-26 PROCEDURE — 74011000258 HC RX REV CODE- 258: Performed by: HOSPITALIST

## 2017-01-26 PROCEDURE — 85027 COMPLETE CBC AUTOMATED: CPT | Performed by: HOSPITALIST

## 2017-01-26 PROCEDURE — 65270000029 HC RM PRIVATE

## 2017-01-26 PROCEDURE — 87086 URINE CULTURE/COLONY COUNT: CPT | Performed by: HOSPITALIST

## 2017-01-26 PROCEDURE — 92610 EVALUATE SWALLOWING FUNCTION: CPT

## 2017-01-26 PROCEDURE — 87040 BLOOD CULTURE FOR BACTERIA: CPT | Performed by: HOSPITALIST

## 2017-01-26 RX ORDER — LEVOFLOXACIN 5 MG/ML
500 INJECTION, SOLUTION INTRAVENOUS
Status: DISCONTINUED | OUTPATIENT
Start: 2017-01-28 | End: 2017-01-29

## 2017-01-26 RX ADMIN — PIPERACILLIN SODIUM,TAZOBACTAM SODIUM 2.25 G: 2; .25 INJECTION, POWDER, FOR SOLUTION INTRAVENOUS at 21:42

## 2017-01-26 RX ADMIN — SODIUM CHLORIDE 1 DROP: 50 SOLUTION OPHTHALMIC at 21:27

## 2017-01-26 RX ADMIN — HEPARIN SODIUM 5000 UNITS: 5000 INJECTION, SOLUTION INTRAVENOUS; SUBCUTANEOUS at 14:28

## 2017-01-26 RX ADMIN — MINERAL OIL 1 DROP: 2; 2 EMULSION OPHTHALMIC at 21:27

## 2017-01-26 RX ADMIN — BRINZOLAMIDE 1 DROP: 10 SUSPENSION/ DROPS OPHTHALMIC at 21:26

## 2017-01-26 RX ADMIN — METHYLPREDNISOLONE SODIUM SUCCINATE 40 MG: 40 INJECTION, POWDER, FOR SOLUTION INTRAMUSCULAR; INTRAVENOUS at 11:42

## 2017-01-26 RX ADMIN — BRINZOLAMIDE 1 DROP: 10 SUSPENSION/ DROPS OPHTHALMIC at 11:40

## 2017-01-26 RX ADMIN — BIMATOPROST 1 DROP: 0.1 SOLUTION/ DROPS OPHTHALMIC at 21:28

## 2017-01-26 RX ADMIN — SODIUM CHLORIDE 1 DROP: 50 SOLUTION OPHTHALMIC at 11:36

## 2017-01-26 RX ADMIN — CARVEDILOL 12.5 MG: 12.5 TABLET, FILM COATED ORAL at 11:38

## 2017-01-26 RX ADMIN — BRIMONIDINE TARTRATE 1 DROP: 1 SOLUTION/ DROPS OPHTHALMIC at 21:30

## 2017-01-26 RX ADMIN — METHYLPREDNISOLONE SODIUM SUCCINATE 40 MG: 40 INJECTION, POWDER, FOR SOLUTION INTRAMUSCULAR; INTRAVENOUS at 01:48

## 2017-01-26 RX ADMIN — DIFLUPREDNATE 1 DROP: 0.5 EMULSION OPHTHALMIC at 21:29

## 2017-01-26 RX ADMIN — ATORVASTATIN CALCIUM 10 MG: 10 TABLET, FILM COATED ORAL at 11:38

## 2017-01-26 RX ADMIN — BRIMONIDINE TARTRATE 1 DROP: 1 SOLUTION/ DROPS OPHTHALMIC at 11:38

## 2017-01-26 RX ADMIN — HEPARIN SODIUM 5000 UNITS: 5000 INJECTION, SOLUTION INTRAVENOUS; SUBCUTANEOUS at 21:42

## 2017-01-26 RX ADMIN — HEPARIN SODIUM 5000 UNITS: 5000 INJECTION, SOLUTION INTRAVENOUS; SUBCUTANEOUS at 01:48

## 2017-01-26 RX ADMIN — METHYLPREDNISOLONE SODIUM SUCCINATE 20 MG: 40 INJECTION, POWDER, FOR SOLUTION INTRAMUSCULAR; INTRAVENOUS at 21:42

## 2017-01-26 RX ADMIN — ESCITALOPRAM OXALATE 10 MG: 10 TABLET ORAL at 11:38

## 2017-01-26 RX ADMIN — Medication 10 ML: at 21:33

## 2017-01-26 RX ADMIN — DIFLUPREDNATE 1 DROP: 0.5 EMULSION OPHTHALMIC at 11:45

## 2017-01-26 RX ADMIN — PILOCARPINE HYDROCHLORIDE 1 DROP: 10 SOLUTION/ DROPS OPHTHALMIC at 21:25

## 2017-01-26 RX ADMIN — CARVEDILOL 12.5 MG: 12.5 TABLET, FILM COATED ORAL at 16:50

## 2017-01-26 RX ADMIN — ASPIRIN 81 MG: 81 TABLET, COATED ORAL at 11:39

## 2017-01-26 RX ADMIN — PIPERACILLIN AND TAZOBACTAM 3.38 G: 3; .375 INJECTION, POWDER, FOR SOLUTION INTRAVENOUS at 08:57

## 2017-01-26 RX ADMIN — LEVOFLOXACIN 500 MG: 5 INJECTION, SOLUTION INTRAVENOUS at 16:50

## 2017-01-26 RX ADMIN — PIPERACILLIN AND TAZOBACTAM 3.38 G: 3; .375 INJECTION, POWDER, FOR SOLUTION INTRAVENOUS at 14:27

## 2017-01-26 RX ADMIN — OLOPATADINE HYDROCHLORIDE 1 DROP: 2 SOLUTION/ DROPS OPHTHALMIC at 11:45

## 2017-01-26 RX ADMIN — Medication 10 ML: at 21:48

## 2017-01-26 RX ADMIN — PILOCARPINE HYDROCHLORIDE 1 DROP: 10 SOLUTION/ DROPS OPHTHALMIC at 11:37

## 2017-01-26 RX ADMIN — SODIUM CHLORIDE 1000 MG: 900 INJECTION, SOLUTION INTRAVENOUS at 16:50

## 2017-01-26 NOTE — PROGRESS NOTES
conducted an initial visit with [facility] Meera, who is a 80 y. o.,female. The  provided the following Interventions:  Initiated a relationship of care and support. Offered prayer and assurance of continued prayers on patient's behalf. Plan:  Chaplains will continue to follow and will provide pastoral care on an as needed/requested basis.  recommends bedside caregivers page  on duty if patient shows signs of acute spiritual or emotional distress.     [facility]  [phone] - Office

## 2017-01-26 NOTE — PROGRESS NOTES
SHIFT SUMMARY: Patient remained free of falls and injuries this shift. Patient refused C-pap after wearing it for approximately 1 hour. Patient remains on Venti mask and continuous pulse ox.        Patient Vitals for the past 12 hrs:   Temp Pulse Resp BP SpO2   01/26/17 0325 98.5 °F (36.9 °C) 74 20 91/50 95 %   01/26/17 0054 - - - - 94 %   01/25/17 2325 97.9 °F (36.6 °C) 91 22 96/50 99 %   01/25/17 2015 - - - - 93 %   01/25/17 1953 98.2 °F (36.8 °C) (!) 57 24 103/59 93 %

## 2017-01-26 NOTE — ROUTINE PROCESS
Bedside shift change report given to Gato Cerna RN (oncoming nurse) by Opal Duckworth RN   (offgoing nurse). Report included the following information SBAR, Kardex and MAR.

## 2017-01-26 NOTE — DISCHARGE SUMMARY
[address] [place] [address]  H&P    Name:  Sydni Moore  MR#:  [id number]  :  1921  Account #:  [de-identified]  Date of Adm:  2017  Date of Discharge:      DIAGNOSES AT Admission  1. Acute respiratory failure. 2. Pneumonia. 3. Chronic atrial fibrillation. 4. Chronic obstructive pulmonary disease on chronic oxygen therapy. 5. Sepsis. 6. Urinary tract infection. 7. Diabetes mellitus. 8. History of third-degree block. HPI: The patient is 80years old. She is admitted to  the emergency room today from the [facility] where she is  chronically on oxygen, but she was saturating in the 80% range today. She has had a right lower lobe pneumonia there that is being treated  with oral antibiotics, but they felt she was failing to improve thus they  sent her to the ER. She apparently at baseline can stand and transfer  to a chair, but she is otherwise not ambulatory. She has had recurrent  pneumonia previously. She has a past medical history to include atrial  fibrillation, diabetes, coronary artery disease, COPD, chronic oxygen  requirements. She has a history of anemia, anxiety, diverticulosis,  GERD, osteoporosis, peripheral neuropathy, and previous history of  sleep apnea, but no longer wears CPAP. PAST SURGICAL HISTORY: Includes a colectomy, appendectomy,  hysterectomy, tonsillectomy, hemorrhoidectomy, and corneal  transplant. FAMILY HISTORY: Positive for heart disease in her mother, cancer in  a father and sister. SOCIAL HISTORY: She lives at a nursing home. She lives there under  full care. She is not a smoker currently. ALLERGIES:  1. LATEX. 2. CODEINE. REVIEW OF SYSTEMS  Limited by her inability to breathe comfortably at this point in time. She  complains of cough, shortness of breath, and sputum production. CARDIOVASCULAR: She complains of chest pain when she does  cough or try to take a deep breath.   GASTROINTESTINAL: She complains of nausea, but no vomiting or  diarrhea. MUSCULOSKELETAL: She feels muscle aches all over and that is as  extensive as I can get from her at this point in time. Her previous hospitalization was in August of 2015. When she came  into the hospital at that point in time, she was treated for cough, fever,  shortness of breath, and required treatment for pneumonia at that point  in time. She also had encephalopathy and she was treated with  chronic Coumadin therapy for her atrial fibrillation. According to the ER  physician who spoke with her daughter, she did not want aggressive  treatment, she wanted no intubation. She has a DO NOT  RESUSCITATE status and she did not want her to receive the fluid  bolus that was recommended through the sepsis protocol for fear of  volume overload and trouble with further breathing for her mother who  is already in a tenuous state at 80years old, so she asked that we  continue antibiotic therapy but consider otherwise conservative  measures for her. PHYSICAL EXAMINATION  GENERAL: This is a very frail, debilitated, elderly, sick-appearing white  female. Pulse is 107, temperature 98. 1. T-max was 102.6. Blood  pressure 110/49, respiratory rate 24-31, SaO2 96%. She is on a  Ventimask. LUNGS: Coarse with scattered rhonchi and wheezes bilaterally. CARDIAC: Diminished heart tones due to the respiratory tones tanned  but tachycardic and irregular. ABDOMEN: Soft, nontender. Mild distention. LOWER EXTREMITIES: 1+ edema bilaterally. SKIN: No notable rash. NEUROLOGIC: Appears nonfocal, white count was 17.2. LABORATORY DATA: Hemoglobin and hematocrit 13.2 and 40.2,  platelets are 901. Urinalysis showed large leukocyte esterase, 10-20  WBCs. Her metabolic profile, BUN and creatinine 32 and 1.55. Lactate  was 2.1, repeat 1.3, troponin less than 0.02. BNP was 24,637. Blood  cultures have been drawn and sent.  Her chest x-ray from early this  morning showed increasing consolidation right lower lobe with small  right effusion. No pneumothorax. Looks like her last echo in 2015  showed an EF of 60% to 65%. There is an INR today of 1.3. MEDICATIONS: Her medications that she is on at the nursing home  include:  1. Multiple eye drops. 2. DuoNeb. 3. Aspirin. 4. Lipitor. 5. BuSpar. 6. Coreg. 7. Vitamin B.  8. Lexapro. 9. Advair. 10. Melatonin. 11. Multivitamin with iron. 12. Spiriva. I do not see Coumadin still on her list at this point in time. ASSESSMENT:  1. Acute respiratory failure. 2. Pneumonia. 3. Chronic atrial fibrillation. 4. History of chronic obstructive pulmonary disease. PLAN: Conservative measures as per requested by the daughter. I am  going to call her to confirm. We have placed a DO NOT RESUSCITATE  CODE status. She is receiving Zosyn. I would like to recommend that  she get a little less fluids and potentially otherwise because her blood  pressure is stable and then she would be at high risk for diastolic heart  failure at this point in time. Will follow up with BMP and CBC in the  morning. I would make her n.p.o. for now considering aspiration risk  with her respiratory status. I have ordered DuoNeb every 4 hours as  needed. Will go ahead and put her on Solu-Medrol 40 mg IV every 8  hours and will monitor her blood sugars with that. I would recommend  also that we probably have Palliative Care see her and her daughter  while she is here. I am going to call her and verify her request at this  point in time and as far as DVT prophylaxis, we can put her on heparin. I am going to put her on Levaquin as well as she is not taking  Coumadin as that appears right now that that was the potential  interaction prior and I do not see Multaq on her list anymore, which  was a concern about a prior medication also.  So she is being treated  for a facility-acquired pneumonia with Levaquin and Zosyn and will  resume her eyedrops that she is on for her eye problems from the  nursing home, treat her with antibiotics, nebulizer treatments. Gentle IV  fluids, steroids, and otherwise conservative comfort measures to  hopefully get her through this, but she is at high risk for morbidity  and mortality, and I would de-escalate care acutely if she did not  respond shortly considering her advanced age and debility and  recurrent pneumonia status.         Da Mandel MD    RI / MS1  D:  01/25/2017   16:00  T:  01/26/2017   12:38  Job #:  [id number]

## 2017-01-26 NOTE — PROGRESS NOTES
Hospitalist Progress Note    Patient: Minerva Hernandez MRN: [id number]  CSN: [id number]    YOB: 1921  Age: 80 y.o. Sex: female    DOA: 1/25/2017 LOS:  LOS: 1 day          Chief Complaint:     Ac resp failure, pneumonia      Assessment/Plan   Sepsis-POA  Ac resp failure-improved today, declined wearing CPAP last night, using nebs PRN  Pneumonia, facility acquired-was on PO abx at [facility] and failed treatment-zosyn and levaquin  Bacteremia-cultures coming back with staph from both bottles drawn yesterday-add vanco and pharmacy to dose-repeat 2 blood cx  UTI-urine cx may not have been sent from ER, replace order  Severe debility-lives at NH and has limited mobility  Diabetes-monmitor BS especially while on steroids  Hx heart block, now with pacemaker  COPD-improved wheezing-reduce and wean IV steroids  Atrial fibrillation  CKD stage 3  Severe glaucoma/eye disease    DNR status  Continue her eye drops as she is on at NH  DVT prophylaxis  Met with family this am and again on IDR rounds  Continue curent level of tx  Speech to assess for safe swallowing and dietary recs    Patient Active Problem List   Diagnosis Code    Hypertension I10    DM (diabetes mellitus) ([address] E11.9    Chronic airway obstruction, not elsewhere classified ([address] J44.9    A-fib ([address] I48.91    Third degree heart block ([address] I44.2    COPD with acute exacerbation ([address] J44.1    UTI (urinary tract infection) N39.0    Pneumonia J18.9    Respiratory distress R06.00    COPD exacerbation ([address] J44.1    Sepsis ([address] A41.9    Acute respiratory failure ([address] J96.00    Bacteremia R78.81       Subjective:    Feeling a bit better  C/o dry mouth  denie CP  breathing easier per daughter  Refused CPAP last night, did not like mask    Review of systems:    Constitutional: denies fevers, chills, myalgias  Respiratory: denies SOB  Cardiovascular: denies chest pain, palpitations  Gastrointestinal: denies nausea, vomiting, diarrhea      Vital signs/Intake and Output:  Visit Vitals    /76 (BP 1 Location: Right arm, BP Patient Position: At rest)    Pulse 91    Temp 98 °F (36.7 °C)    Resp 20    Ht 5' 6\" (1.676 m)    Wt 73.5 kg (162 lb 1.6 oz)    SpO2 95%    BMI 26.16 kg/m2     Current Shift:     Last three shifts:  01/24 1901 - 01/26 0700  In: 898.8 [I.V.:898.8]  Out: -     Exam:    General: frail [place] debilitated elderly Wf, on mask, NAD  Head/Neck: NCAT, supple, No masses, No lymphadenopathy  CVS:Regular rate and rhythm, no M/R/G, S1/S2 heard, no thrill  Lungs:coarse BS, no wheezes, diminished at bases  Abdomen: Soft, Nontender, No distention, Normal Bowel sounds, No hepatomegaly  Extremities: No C/C/E, pulses palpable 2+  Neuro:grossly normal , follows commands  Psych:appropriate                Labs: Results:       Chemistry Recent Labs      01/26/17 0327 01/25/17   0940   GLU  131*  144*   NA  139  137   K  4.8  4.2   CL  104  102   CO2  23  25   BUN  44*  32*   CREA  1.87*  1.55*   CA  8.5  9.1   AGAP  12  10   BUCR  24*  21*   AP   --   58   TP   --   6.6   ALB   --   2.9*   GLOB   --   3.7   AGRAT   --   0.8      CBC w/Diff Recent Labs      01/26/17   0327  01/25/17   0940   WBC  12.9  17.2*   RBC  3.83*  4.38   HGB  11.4*  13.2   HCT  35.5  40.2   PLT  247  343   GRANS   --   83*   LYMPH   --   10*   EOS   --   0      Cardiac Enzymes Recent Labs      01/25/17   0940   CPK  40   CKND1  CANNOT BE CALCULATED      Coagulation Recent Labs      01/25/17   0940   PTP  15.9*   INR  1.3*   APTT  20.3*       Lipid Panel Lab Results   Component Value Date/Time    Cholesterol, total 147 06/24/2012 02:15 AM    HDL Cholesterol 46 06/24/2012 02:15 AM    LDL, calculated 78.8 06/24/2012 02:15 AM    VLDL, calculated 22.2 06/24/2012 02:15 AM    Triglyceride 111 06/24/2012 02:15 AM    CHOL/HDL Ratio 3.2 06/24/2012 02:15 AM      BNP No results for input(s): BNPP in the last 72 hours.    Liver Enzymes Recent Labs      01/25/17   0940   TP  6.6   ALB  2.9* AP  58   SGOT  15      Thyroid Studies Lab Results   Component Value Date/Time    TSH 1.31 05/10/2015 05:00 AM        Procedures/imaging: see electronic medical records for all procedures/Xrays and details which were not copied into this note but were reviewed prior to creation of John Contreras MD

## 2017-01-26 NOTE — PROGRESS NOTES
Physical Therapy Screening:  Services are not indicated at this time. An InBasket screening referral was triggered for physical therapy based on results obtained during the nursing admission assessment. The patients chart was reviewed and the patient is not appropriate for a skilled therapy evaluation at this time. Please consult physical therapy if any therapy needs arise. Thank you.     Renetta Villanueva PT, DPT

## 2017-01-26 NOTE — ROUTINE PROCESS
Bedside and Verbal shift change report given to MATTHEW Tovar (oncoming nurse) by Lisa Velasco RN (offgoing nurse). Report included the following information SBAR, Kardex, ED Summary, [facility] and Recent Results.

## 2017-01-26 NOTE — PROGRESS NOTES
Received report from MATTHEW Ramirez RN.      7461 patient assessment was completed at this time. Family is at the bed side. No needs were voiced at this time. [facility] therapy was coming in to evaluate the patient.       Shift summary-  Patient had uneventful shift

## 2017-01-26 NOTE — PROGRESS NOTES
Readmission Risk Assessment:     High Risk and MSSP/Good Help ACO patients    RRAT Score:  21 or greater    Initial Assessment: Chart reviewed and noted Pt admitted to medical unit for Pneumonia. Pt is a resident at [place] [address]. CM following for transition of care needs and likely patient return to [place] when stable     Emergency Contact:  See Judge Gloria Carlin [phone]    Pertinent Medical Hx:  See H&P      PCP/Specialists:  [address]:      DME:         High Risk Care Transition Plan:  1. Evaluate for [facility] or [facility], SNF, acute rehab, community care coordination of Resources. 2. Involve patient/caregiver in assessment, planning, education and implement of intervention. 3. CM daily patient care huddles/interdisciplinary rounds. 4. PCP/Specialist appointment within 48 hours of discharge unless otherwise specified. 5. Facilitate transportation and logistics for follow-up appointments. 6. Medication reconciliation [id number] [facility]  7. Discharge follow-up phone call within 2  4 days (NN, [facility], Nursing) CM follow up as assigned. 8. Formal handoff between hospital provider and post-acute provider to transition patient  Handoff to [address] Nurse Navigator or PCP practice.

## 2017-01-26 NOTE — PROGRESS NOTES
Problem: Dysphagia (Adult)  Goal: *Acute Goals and Plan of Care (Insert Text)  Patient seen for clinical bedside swallow evaluation this morning. Patient on non-re-breather upon SLP arrival, but transitioned to nasal canula. Patient provided oral care at the start of evaluation. Patient able to tolerate oral care. Patient then presented thin, puree, mechanical soft, and solid consistencies. Patient with increased mastication time with solid with decreased propulsion of boluses. Patient with positive residue post swallow of greater than 10%. Patient with efficient manipulation and less than 10% stasis with mechancial soft. Patient with decreased hyolaryngeal excursion and positive s/sx of aspiration with thin liquid consistencies characterized by strong coughing, s/sx of aspiration remediated with NTL consistencies. Patient presents with (+) mild-moderate oropharyngeal dysphagia. Patient able to self feed and take small sips of liquid, but not able to utilize spoon with self feeding this day. Patient, family, and patients nurse, Geo Dillon, educated with regard to compensatory swallow strategies and aspiration/reflux precautions including small bites/sips, alternate liquids/solids, decrease feeding rate, HOB > 30 with all po, and upright in bed at 30 degrees after po for at least 45 minutes. Diet recommendation of mechanical soft with NTL discussed at length with patient and nursing. Patient may benefit from modified barium swallow to assess swallow function as patient with positive overt s/sx of aspiration during bedside swallow evaluation. Rec: Mechanical soft diet with NTL  Strict aspiration/reflux precautions;    Pt must be fed w/ HOB >30, remain >30 for 30-45 minutes after po   Small bites/sips; alternate liquid/solid with slow feeding rate   Verbal cues to swallow twice per bite/sip  Oral care p po  Meds crushed in applesauce    Patient will:  Utilize aspiration/reflux precautions, compensatory eating/swallow strategies, diet modifications and oral care recommendations with minimal A (visual, verbal, tactile cues) in 4/5 trials. Tolerate least restrictive diet using above without overt s/sx aspiration/distress in 4/5 trials with min A (visual, verbal, tactile cues). Participate in MBS to further evaluate swallow function and assess s/sx of aspiration as indicated and ordered per MD.   Perform compensatory swallow maneuvers and exercise to increase swallow function/safety as indicated with min A (visual, verbal, tactile cues). Outcome: Progressing Towards Goal  SPEECH LANGUAGE PATHOLOGY BEDSIDE SWALLOW EVALUATION     Patient: Nikolai Beach (89 y.o. female)  Date: 1/26/2017  Primary Diagnosis: Respiratory distress  Pneumonia  Sepsis ([facility]  UTI (urinary tract infection)  COPD exacerbation ([facility])        Precautions: Aspiration         ASSESSMENT :  As above. Patient will benefit from skilled intervention to address the above impairments. Patients rehabilitation potential is considered to be Good  Factors which may influence rehabilitation potential include:   [ ]            None noted  [X]            Mental ability/status  [X]            Medical condition  [ ]            Home/family situation and support systems  [X]            Safety awareness  [ ]            Pain tolerance/management  [ ]            Other:        PLAN :  Recommendations and Planned Interventions:  As above. Frequency/Duration: Patient will be followed by speech-language pathology 3 times a week to address goals. Discharge Recommendations: To Be Determined       SUBJECTIVE:   Patient stated That's too thick.   OBJECTIVE:       Past Medical History   Diagnosis Date    Adhesive capsulitis      Anemia      Anxiety      Arthritis      Asthma      Atrial fibrillation (HCC)      CAD (coronary artery disease)      COPD      Diabetes (HCC)      Diverticulosis      GERD (gastroesophageal reflux disease)      Hypertension      Osteoporosis      Peripheral neuropathy ([facility]      Psychiatric disorder         depression    Thromboembolus ([facility]      Unspecified sleep apnea         no longer wears CPAP. Past Surgical History   Procedure Laterality Date    Hx colectomy        Hx appendectomy        Hx gyn           hysterectomy    Hx heent           tonsillectomy    Hx hemorrhoidectomy        Hx corneal transplant         Prior Level of Function/Home Situation:   Current Diet:  NPO  Cognitive and Communication Status:  Neurologic State: Alert  Orientation Level: Oriented X4  Cognition: Follows commands  Perception: Appears intact  Perseveration: No perseveration noted  Safety/Judgement: Decreased insight into deficits  Oral Assessment:  Oral Assessment  Labial: Decreased rate  Dentition: Upper & lower dentures  Lingual: Decreased rate  Velum: No impairment  Mandible: No impairment  Gag Reflex:  (Not elicited)  P.O. Trials:  Patient Position: HOB > 45  Vocal quality prior to P.O.: No impairment  Consistency Presented: Mechanical soft;Puree; Solid; Thin liquid  How Presented: Self-fed/presented;Cup/sip;Spoon;Straw;Successive swallows  Bolus Acceptance: No impairment  Bolus Formation/Control: Impaired  Type of Impairment: Delayed;Premature spillage;Mastication  Propulsion: No impairment  Oral Residue: None  Initiation of Swallow: Delayed (# of seconds)  Laryngeal Elevation: Decreased  Aspiration Signs/Symptoms: Strong cough  Pharyngeal Phase Characteristics: Multiple swallows; Suspected pharyngeal residue  Effective Modifications:  Alternate liquids/solids;Cup/sip;Straw;Spoon;Small sips and bites  Cues for Modifications: Maximal  Oral Phase Severity: Mild-moderate  Pharyngeal Phase Severity : Mild-moderate     GCODESwallowing:  Swallow Current Status CJ= 20-39%   Swallow Goal Status CJ= 20-39%     The severity rating is based on the following outcomes:  ARUN Noms Swallow Level 5              Clinical Judgement     PAIN:  Start of Eval: 0  End of Eval: 0      After treatment:   [ ]            Patient left in no apparent distress sitting up in chair  [X]            Patient left in no apparent distress in bed  [X]            Call bell left within reach  [X]            Nursing notified  [ ]            Family present  [ ]            Caregiver present  [ ]            Bed alarm activated  COMMUNICATION/EDUCATION:   [X]            Aspiration precautions; swallow safety; compensatory techniques. [X]            Patient/family have participated as able in goal setting and plan of care. [ ]            Patient/family agree to work toward stated goals and plan of care. [ ]            Patient understands intent and goals of therapy; neutral about participation. [ ]            Patient unable to participate in goal setting/plan of care; educ ongoing with interdisciplinary staff  [ ]             Posted safety precautions in patient's room.      Thank you for this referral.  PEGGY Maradiaga  Time Calculation: 35 mins

## 2017-01-26 NOTE — ROUTINE PROCESS
Bedside and Verbal shift change report given to ANGI CARRERA [facility] RN (oncoming nurse) by Annelise Bose. Carrie RN (offgoing nurse). Report included the following information SBAR, Kardex, Intake/Output and MAR.    Visit Vitals    /64 (BP 1 Location: Right arm, BP Patient Position: At rest)    Pulse 98    Temp 97.9 °F (36.6 °C)    Resp 20    Ht 5' 6\" (1.676 m)    Wt 73.5 kg (162 lb 1.6 oz)    SpO2 95%    BMI 26.16 kg/m2

## 2017-01-26 NOTE — PROGRESS NOTES
Patient did not tolerate CPAP for more than a short period. Placed on a 50% venti mask. CPAP machine on stand-by in case if it is attempted again later.   O2 sats 94-96% on the venti mask

## 2017-01-27 PROBLEM — B95.62 MRSA BACTEREMIA: Status: ACTIVE | Noted: 2017-01-27

## 2017-01-27 PROBLEM — R94.31 LONG QT INTERVAL: Status: ACTIVE | Noted: 2017-01-27

## 2017-01-27 PROBLEM — R78.81 MRSA BACTEREMIA: Status: ACTIVE | Noted: 2017-01-27

## 2017-01-27 PROBLEM — J18.9 HCAP (HEALTHCARE-ASSOCIATED PNEUMONIA): Status: ACTIVE | Noted: 2017-01-27

## 2017-01-27 LAB
ANION GAP BLD CALC-SCNC: 10 MMOL/L (ref 3–18)
ANION GAP BLD CALC-SCNC: 12 MMOL/L (ref 3–18)
BACTERIA SPEC CULT: NORMAL
BACTERIA SPEC CULT: NORMAL
BUN SERPL-MCNC: 48 MG/DL (ref 7–18)
BUN SERPL-MCNC: 52 MG/DL (ref 7–18)
BUN/CREAT SERPL: 30 (ref 12–20)
BUN/CREAT SERPL: 34 (ref 12–20)
CALCIUM SERPL-MCNC: 8.5 MG/DL (ref 8.5–10.1)
CALCIUM SERPL-MCNC: 8.7 MG/DL (ref 8.5–10.1)
CHLORIDE SERPL-SCNC: 107 MMOL/L (ref 100–108)
CHLORIDE SERPL-SCNC: 110 MMOL/L (ref 100–108)
CO2 SERPL-SCNC: 22 MMOL/L (ref 21–32)
CO2 SERPL-SCNC: 23 MMOL/L (ref 21–32)
CREAT SERPL-MCNC: 1.53 MG/DL (ref 0.6–1.3)
CREAT SERPL-MCNC: 1.62 MG/DL (ref 0.6–1.3)
ERYTHROCYTE [DISTWIDTH] IN BLOOD BY AUTOMATED COUNT: 14.3 % (ref 11.6–14.5)
GLUCOSE BLD STRIP.AUTO-MCNC: 128 MG/DL (ref 70–110)
GLUCOSE BLD STRIP.AUTO-MCNC: 130 MG/DL (ref 70–110)
GLUCOSE BLD STRIP.AUTO-MCNC: 133 MG/DL (ref 70–110)
GLUCOSE BLD STRIP.AUTO-MCNC: 133 MG/DL (ref 70–110)
GLUCOSE SERPL-MCNC: 131 MG/DL (ref 74–99)
GLUCOSE SERPL-MCNC: 139 MG/DL (ref 74–99)
GRAM STN SPEC: NORMAL
HCT VFR BLD AUTO: 32.1 % (ref 35–45)
HGB BLD-MCNC: 10.3 G/DL (ref 12–16)
MAGNESIUM SERPL-MCNC: 1.9 MG/DL (ref 1.8–2.4)
MCH RBC QN AUTO: 29.9 PG (ref 24–34)
MCHC RBC AUTO-ENTMCNC: 32.1 G/DL (ref 31–37)
MCV RBC AUTO: 93 FL (ref 74–97)
PLATELET # BLD AUTO: 244 K/UL (ref 135–420)
PMV BLD AUTO: 9.5 FL (ref 9.2–11.8)
POTASSIUM SERPL-SCNC: 3.6 MMOL/L (ref 3.5–5.5)
POTASSIUM SERPL-SCNC: 3.9 MMOL/L (ref 3.5–5.5)
RBC # BLD AUTO: 3.45 M/UL (ref 4.2–5.3)
SERVICE CMNT-IMP: NORMAL
SODIUM SERPL-SCNC: 141 MMOL/L (ref 136–145)
SODIUM SERPL-SCNC: 143 MMOL/L (ref 136–145)
VANCOMYCIN SERPL-MCNC: 8.8 UG/ML (ref 5–40)
WBC # BLD AUTO: 7.8 K/UL (ref 4.6–13.2)

## 2017-01-27 PROCEDURE — 85027 COMPLETE CBC AUTOMATED: CPT | Performed by: HOSPITALIST

## 2017-01-27 PROCEDURE — 65270000029 HC RM PRIVATE

## 2017-01-27 PROCEDURE — 74011250637 HC RX REV CODE- 250/637: Performed by: HOSPITALIST

## 2017-01-27 PROCEDURE — 77010033678 HC OXYGEN DAILY

## 2017-01-27 PROCEDURE — 83735 ASSAY OF MAGNESIUM: CPT | Performed by: FAMILY MEDICINE

## 2017-01-27 PROCEDURE — 36415 COLL VENOUS BLD VENIPUNCTURE: CPT | Performed by: HOSPITALIST

## 2017-01-27 PROCEDURE — 94660 CPAP INITIATION&MGMT: CPT

## 2017-01-27 PROCEDURE — 82962 GLUCOSE BLOOD TEST: CPT

## 2017-01-27 PROCEDURE — 74011000258 HC RX REV CODE- 258: Performed by: HOSPITALIST

## 2017-01-27 PROCEDURE — 92526 ORAL FUNCTION THERAPY: CPT

## 2017-01-27 PROCEDURE — 80048 BASIC METABOLIC PNL TOTAL CA: CPT | Performed by: FAMILY MEDICINE

## 2017-01-27 PROCEDURE — 80048 BASIC METABOLIC PNL TOTAL CA: CPT | Performed by: HOSPITALIST

## 2017-01-27 PROCEDURE — 74011250636 HC RX REV CODE- 250/636: Performed by: HOSPITALIST

## 2017-01-27 PROCEDURE — 80202 ASSAY OF VANCOMYCIN: CPT | Performed by: HOSPITALIST

## 2017-01-27 RX ADMIN — BRINZOLAMIDE 1 DROP: 10 SUSPENSION/ DROPS OPHTHALMIC at 23:14

## 2017-01-27 RX ADMIN — ATORVASTATIN CALCIUM 10 MG: 10 TABLET, FILM COATED ORAL at 10:36

## 2017-01-27 RX ADMIN — ESCITALOPRAM OXALATE 10 MG: 10 TABLET ORAL at 10:36

## 2017-01-27 RX ADMIN — BRINZOLAMIDE 1 DROP: 10 SUSPENSION/ DROPS OPHTHALMIC at 10:19

## 2017-01-27 RX ADMIN — BRIMONIDINE TARTRATE 1 DROP: 1 SOLUTION/ DROPS OPHTHALMIC at 10:18

## 2017-01-27 RX ADMIN — METHYLPREDNISOLONE SODIUM SUCCINATE 20 MG: 40 INJECTION, POWDER, FOR SOLUTION INTRAMUSCULAR; INTRAVENOUS at 23:12

## 2017-01-27 RX ADMIN — DIFLUPREDNATE 1 DROP: 0.5 EMULSION OPHTHALMIC at 23:13

## 2017-01-27 RX ADMIN — METHYLPREDNISOLONE SODIUM SUCCINATE 20 MG: 40 INJECTION, POWDER, FOR SOLUTION INTRAMUSCULAR; INTRAVENOUS at 10:38

## 2017-01-27 RX ADMIN — PIPERACILLIN SODIUM,TAZOBACTAM SODIUM 2.25 G: 2; .25 INJECTION, POWDER, FOR SOLUTION INTRAVENOUS at 03:31

## 2017-01-27 RX ADMIN — PIPERACILLIN SODIUM,TAZOBACTAM SODIUM 2.25 G: 2; .25 INJECTION, POWDER, FOR SOLUTION INTRAVENOUS at 10:37

## 2017-01-27 RX ADMIN — DIFLUPREDNATE 1 DROP: 0.5 EMULSION OPHTHALMIC at 09:00

## 2017-01-27 RX ADMIN — MINERAL OIL 1 DROP: 2; 2 EMULSION OPHTHALMIC at 23:15

## 2017-01-27 RX ADMIN — ASPIRIN 81 MG: 81 TABLET, COATED ORAL at 10:36

## 2017-01-27 RX ADMIN — MINERAL OIL 1 DROP: 2; 2 EMULSION OPHTHALMIC at 15:24

## 2017-01-27 RX ADMIN — PILOCARPINE HYDROCHLORIDE 1 DROP: 10 SOLUTION/ DROPS OPHTHALMIC at 03:30

## 2017-01-27 RX ADMIN — SODIUM CHLORIDE 750 MG: 900 INJECTION, SOLUTION INTRAVENOUS at 17:35

## 2017-01-27 RX ADMIN — PILOCARPINE HYDROCHLORIDE 1 DROP: 10 SOLUTION/ DROPS OPHTHALMIC at 10:20

## 2017-01-27 RX ADMIN — CARVEDILOL 12.5 MG: 12.5 TABLET, FILM COATED ORAL at 17:32

## 2017-01-27 RX ADMIN — DIFLUPREDNATE 1 DROP: 0.5 EMULSION OPHTHALMIC at 16:38

## 2017-01-27 RX ADMIN — MINERAL OIL 1 DROP: 2; 2 EMULSION OPHTHALMIC at 10:46

## 2017-01-27 RX ADMIN — HEPARIN SODIUM 5000 UNITS: 5000 INJECTION, SOLUTION INTRAVENOUS; SUBCUTANEOUS at 06:34

## 2017-01-27 RX ADMIN — LOTEPREDNOL ETABONATE 1 EACH: 5 GEL OPHTHALMIC at 09:00

## 2017-01-27 RX ADMIN — Medication 10 ML: at 14:00

## 2017-01-27 RX ADMIN — PIPERACILLIN SODIUM,TAZOBACTAM SODIUM 2.25 G: 2; .25 INJECTION, POWDER, FOR SOLUTION INTRAVENOUS at 23:12

## 2017-01-27 RX ADMIN — PILOCARPINE HYDROCHLORIDE 1 DROP: 10 SOLUTION/ DROPS OPHTHALMIC at 15:00

## 2017-01-27 RX ADMIN — CARVEDILOL 12.5 MG: 12.5 TABLET, FILM COATED ORAL at 10:36

## 2017-01-27 RX ADMIN — HEPARIN SODIUM 5000 UNITS: 5000 INJECTION, SOLUTION INTRAVENOUS; SUBCUTANEOUS at 23:12

## 2017-01-27 RX ADMIN — BRINZOLAMIDE 1 DROP: 10 SUSPENSION/ DROPS OPHTHALMIC at 16:38

## 2017-01-27 RX ADMIN — HEPARIN SODIUM 5000 UNITS: 5000 INJECTION, SOLUTION INTRAVENOUS; SUBCUTANEOUS at 14:20

## 2017-01-27 RX ADMIN — BRINZOLAMIDE 1 DROP: 10 SUSPENSION/ DROPS OPHTHALMIC at 17:39

## 2017-01-27 RX ADMIN — BRIMONIDINE TARTRATE 1 DROP: 1 SOLUTION/ DROPS OPHTHALMIC at 23:15

## 2017-01-27 RX ADMIN — PIPERACILLIN SODIUM,TAZOBACTAM SODIUM 2.25 G: 2; .25 INJECTION, POWDER, FOR SOLUTION INTRAVENOUS at 15:39

## 2017-01-27 RX ADMIN — PILOCARPINE HYDROCHLORIDE 1 DROP: 10 SOLUTION/ DROPS OPHTHALMIC at 23:14

## 2017-01-27 RX ADMIN — MINERAL OIL 1 DROP: 2; 2 EMULSION OPHTHALMIC at 20:06

## 2017-01-27 RX ADMIN — OLOPATADINE HYDROCHLORIDE 1 DROP: 2 SOLUTION/ DROPS OPHTHALMIC at 10:19

## 2017-01-27 RX ADMIN — SODIUM CHLORIDE 1 DROP: 50 SOLUTION OPHTHALMIC at 10:20

## 2017-01-27 RX ADMIN — BIMATOPROST 1 DROP: 0.1 SOLUTION/ DROPS OPHTHALMIC at 23:13

## 2017-01-27 RX ADMIN — BRIMONIDINE TARTRATE 1 DROP: 1 SOLUTION/ DROPS OPHTHALMIC at 16:34

## 2017-01-27 NOTE — PROGRESS NOTES
Chart reviewed, noted 80 yr old female admitted from [facility]. Met briefly with pt who was sleeping. T/C pt's dtr Kristy Burns (cell: [phone], home: [phone]) who stated that pt was a long term resident of [facility] and would return there via stretcher ambulance when stable. FOC completed for [facility]; referral called to CMS. Will cont to follow for response from [facility].

## 2017-01-27 NOTE — WOUND CARE
Wound care in for assessment due to low albina score. During assessment it was noted that patient was wearing briefs and had been incontinent of urine. Briefs were removed. 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 The patient reports that this is a chronic problem. There is blanchable redness to the coccyx area. Patient repositioned for comfort. Bed placed in lowest position.

## 2017-01-27 NOTE — PROGRESS NOTES
SHIFT SUMMARY: Patient remained on C-pap this shift. Patient remained free of falls and injuries this shift.        Patient Vitals for the past 12 hrs:   Temp Pulse Resp BP SpO2   01/27/17 0402 98.7 °F (37.1 °C) 74 18 (!) 136/91 97 %   01/26/17 2257 97.7 °F (36.5 °C) 80 20 125/68 93 %   01/26/17 2002 99 °F (37.2 °C) 95 20 129/61 97 %

## 2017-01-27 NOTE — PROGRESS NOTES
Pharmacy Dosing Services: Vancomycin  CrCl ~ 20.6 ml/min  WBC = 7.8  Vancomycin random level = 8.8  One dose of Vancomycin 1 gm given so far ( 1/26/17  @ 1650 )   With advanced age ( 80 years old ) and continued dosing, anticipate trough to reach goal level.   Continue Vancomycin 750 mg IVPB q24h    Pharmacy to continue to follow and adjust dose as necessary   Dana Thomas [facility]  [phone]

## 2017-01-27 NOTE — H&P
[address] [place] [address]  ROUTINE HISTORY AND PHYSICAL    Name:  David Carlisle  MR#:  [id number]  :  1921  Account #:  [de-identified]  Date of Adm:  2017      CORRECTED DOCUMENT - Replaces note #[id number] (imported  with incorrect work type); 17    ADMITTING DIAGNOSES  1. Acute respiratory failure. 2. Pneumonia. 3. Chronic atrial fibrillation. 4. Chronic obstructive pulmonary disease on chronic oxygen therapy. 5. Sepsis. 6. Urinary tract infection. 7. Diabetes mellitus. 8. History of third-degree block. HISTORY OF PRESENT ILLNESS:  The patient is 80years old. She is  admitted to the emergency room today from the [facility]  where she is chronically on oxygen, but she was saturating in the 80%  range today. She has had a right lower lobe pneumonia there that is  being treated with oral antibiotics, but they felt she was failing to  improve thus they sent her to the ER. She apparently, at baseline, can  stand and transfer to a chair, but she is otherwise not ambulatory. She  has had recurrent pneumonia previously. PAST MEDICAL HISTORY: Includes atrial fibrillation, diabetes,  coronary artery disease, COPD, chronic oxygen requirements. She  has a history of anemia, anxiety, diverticulosis, GERD, osteoporosis,  peripheral neuropathy, and previous history of sleep apnea, but no  longer wears CPAP. PAST SURGICAL HISTORY: Includes a colectomy, appendectomy,  hysterectomy, tonsillectomy, hemorrhoidectomy, and corneal  transplant. FAMILY HISTORY: Positive for heart disease in her mother, cancer in  a father and sister. SOCIAL HISTORY: She lives at a nursing home. She lives there under  full care. She is not a smoker currently. ALLERGIES:  1. LATEX. 2. CODEINE. REVIEW OF SYSTEMS  Limited by her inability to breathe comfortably at this point in time. She  complains of cough, shortness of breath, and sputum production.   CARDIOVASCULAR: She complains of chest pain when she does  cough or try to take a deep breath. GASTROINTESTINAL: She complains of nausea, but no vomiting or  diarrhea. MUSCULOSKELETAL: She feels muscle aches all over and that is as  extensive as I can get from her at this point in time. Her previous hospitalization was in August of 2015. When she came  into the hospital at that point in time, she was treated for cough, fever,  shortness of breath, and required treatment for pneumonia at that point  in time. She also had encephalopathy and she was treated with  chronic Coumadin therapy for her atrial fibrillation. According to the ER  physician who spoke with her daughter, she did not want aggressive  treatment, she wanted no intubation. She has a DO NOT  RESUSCITATE status and she did not want her to receive the fluid  bolus that was recommended through the sepsis protocol for fear of  volume overload and trouble with further breathing for her mother, who  is already in a tenuous state at 80years old, so she asked that we  continue antibiotic therapy but consider otherwise conservative  measures for her. PHYSICAL EXAMINATION  GENERAL: This is a very frail, debilitated, elderly, sick-appearing white  female. Pulse is 107, temperature 98. 1. T-max was 102.6. Blood  pressure 110/49, respiratory rate 24-31, SaO2 96%. She is on a  Ventimask. LUNGS: Coarse with scattered rhonchi and wheezes bilaterally. CARDIAC: Diminished heart tones due to the respiratory tones tanned  but tachycardic and irregular. ABDOMEN: Soft, nontender. Mild distention. LOWER EXTREMITIES: 1+ edema bilaterally. SKIN: No notable rash. NEUROLOGIC: Appears nonfocal, white count was 17.2. LABORATORY DATA: Hemoglobin and hematocrit 13.2 and 40.2,  platelets are 084. Urinalysis showed large leukocyte esterase, 10-20  WBCs. Her metabolic profile, BUN and creatinine 32 and 1.55. Lactate  was 2.1, repeat 1.3, troponin less than 0.02. BNP was 24,637.  Blood  cultures have been drawn and sent. Her chest x-ray from early this morning showed increasing  consolidation right lower lobe with small right effusion. No  pneumothorax. Looks like her last echo in 2015 showed an EF of 60% to 65%. There is an INR today of 1.3. MEDICATIONS: Her medications that she is on at the nursing home  include:  1. Multiple eye drops. 2. DuoNeb. 3. Aspirin. 4. Lipitor. 5. BuSpar. 6. Coreg. 7. Vitamin B.  8. Lexapro. 9. Advair. 10. Melatonin. 11. Multivitamin with iron. 12. Spiriva. I do not see Coumadin still on her list at this point in time. ASSESSMENT:  1. Acute respiratory failure. 2. Pneumonia. 3. Chronic atrial fibrillation. 4. History of chronic obstructive pulmonary disease. PLAN: Conservative measures as per requested by the daughter. I am  going to call her to confirm. We have placed a DO NOT RESUSCITATE  CODE status. She is receiving Zosyn. I would like to recommend that  she get a little less fluids and potentially otherwise because her blood  pressure is stable and then she would be at high risk for diastolic heart  failure at this point in time. Will follow up with BMP and CBC in the  morning. I would make her n.p.o. for now considering aspiration risk  with her respiratory status. I have ordered DuoNeb every 4 hours as  needed. Will go ahead and put her on Solu-Medrol 40 mg IV every 8  hours and will monitor her blood sugars with that. I would recommend  also that we probably have Palliative Care see her and her daughter  while she is here. I am going to call her and verify her request at this  point in time and as far as DVT prophylaxis, we can put her on heparin. I am going to put her on Levaquin as well as she is not taking  Coumadin as that appears right now that that was the potential  interaction prior and I do not see Multaq on her list anymore, which  was a concern about a prior medication also.  So she is being treated  for a facility-acquired pneumonia with Levaquin and Zosyn and will  resume her eyedrops that she is on for her eye problems from the  nursing home, treat her with antibiotics, nebulizer treatments. Gentle IV  fluids, steroids, and otherwise conservative comfort measures to  hopefully get her through this, but she is at high risk for morbidity  and mortality, and I would de-escalate care acutely if she did not  respond shortly considering her advanced age and debility and  recurrent pneumonia status.         Isai Rosado MD    RI / MS1  D:  01/25/2017   16:00  T:  01/26/2017   12:38  Job #:  [id number]

## 2017-01-27 NOTE — ROUTINE PROCESS
Bedside shift change report given to Laury English RN (oncoming nurse) by Darrow Frankel, RN   (offgoing nurse). Report included the following information SBAR, Kardex, MAR and Recent Results.

## 2017-01-27 NOTE — PROGRESS NOTES
Problem: Dysphagia (Adult)  Goal: *Acute Goals and Plan of Care (Insert Text)  Patient seen for follow up diagnostic dysphagia therapy to ensure tolerance of recommended diet consistencies. Patient placed on nasal canula by nursing staff, [address] [place] [address]. Patient then presented puree, mechanical soft, and thin liquids. Patient able to tolerate 8 ounces of thin liquid via cup/straw as well as puree x 4 ounces, and mechanical soft x 3 trials. Patient with increased mastication time with mech soft trials and mildly decreased bolus propulsion with stasis post swallow of approximately 10%. Residue is effectively remediated with liquid wash. She has mildly delayed, but efficient hyolaryngeal excursion upon palpation. Patient presents with (+) mild-moderate oral and mild pharyngeal dysphagia. Patient, and patients nurse, [address] [place] [address], educated with regard to compensatory swallow strategies and aspiration/reflux precautions including small bites/sips, alternate liquids/solids, decrease feeding rate, HOB > 30 with all po, and upright in bed at 30 degrees after po for at least 45 minutes. Diet recommendation of mechanical soft with thin discussed with physician, Dr. Candida Morse, and nursing. Physician recommends continuation of mech soft with NTL at this time. Rec: Mechanical soft diet with NTL  Strict aspiration/reflux precautions; Pt must be fed w/ HOB >30, remain >30 for 30-45 minutes after po   Small bites/sips; alternate liquid/solid with slow feeding rate   Verbal cues to swallow twice per bite/sip  Oral care p po  Meds crushed in applesauce    Patient will:  Utilize aspiration/reflux precautions, compensatory eating/swallow strategies, diet modifications and oral care recommendations with minimal A (visual, verbal, tactile cues) in 4/5 trials. Tolerate least restrictive diet using above without overt s/sx aspiration/distress in 4/5 trials with min A (visual, verbal, tactile cues).    Participate in MBS to further evaluate swallow function and assess s/sx of aspiration as indicated and ordered per MD.   Perform compensatory swallow maneuvers and exercise to increase swallow function/safety as indicated with min A (visual, verbal, tactile cues). Outcome: Progressing Towards Goal  SPEECH LANGUAGE PATHOLOGY DYSPHAGIA TREATMENT     Patient: Roxi Alonzo (89 y.o. female)  Date: 1/27/2017  Diagnosis: Respiratory distress  Pneumonia  Sepsis ([address]  UTI (urinary tract infection)  COPD exacerbation (HCC) Acute respiratory failure (HCC)       Precautions: Aspiration        ASSESSMENT:  As above. Progression toward goals:  [ ]         Improving appropriately and progressing toward goals  [X]         Improving slowly and progressing toward goals  [ ]         Not making progress toward goals and plan of care will be adjusted       PLAN:  Recommendations and Planned Interventions:  As above. Patient continues to benefit from skilled intervention to address the above impairments. Continue treatment per established plan of care. Discharge Recommendations: To Be Determined       SUBJECTIVE:   Patient stated I feel like I have slime in my mouth.   OBJECTIVE:   Cognitive and Communication Status:  Neurologic State: Alert  Orientation Level: Oriented to person  Cognition: Follows commands  Perception: Appears intact  Perseveration: No perseveration noted  Safety/Judgement: Decreased insight into deficits  Dysphagia Treatment:  Oral Assessment:  Oral Assessment  Labial: Decreased rate  Dentition: Upper & lower dentures  Oral Hygiene:  (Good)  Lingual: Decreased rate  Velum: No impairment  Mandible: No impairment  Gag Reflex:  (Not elicited)  P.O.  Trials:  Patient Position: HOB > 45  Vocal quality prior to P.O.: No impairment  Consistency Presented: Mechanical soft, Puree, Thin liquid  How Presented: Self-fed/presented, Cup/sip, Spoon, Straw, Successive swallows  Bolus Acceptance: No impairment  Bolus Formation/Control: Impaired  Type of Impairment: Delayed, Premature spillage  Propulsion: No impairment  Oral Residue: None  Initiation of Swallow: Delayed (# of seconds)  Laryngeal Elevation: Functional  Aspiration Signs/Symptoms: None  Pharyngeal Phase Characteristics: No impairment, issues, or problems   Effective Modifications: Alternate liquids/solids, Cup/sip, Spoon, Small sips and bites  Cues for Modifications: Maximal  Oral Phase Severity: Mild  Pharyngeal Phase Severity : Mild  After treatment:   [ ]              Patient left in no apparent distress sitting up in chair  [X]              Patient left in no apparent distress in bed  [X]              Call bell left within reach  [X]              Nursing notified  [ ]              Caregiver present  [ ]              Bed alarm activated  COMMUNICATION/EDUCATION:   [X]              Posted safety precautions in patient's room.      Olga Gorman, SLP  Time Calculation: 30 mins

## 2017-01-27 NOTE — PROGRESS NOTES
Pharmacy Dosing Services: Vancomycin    Consult for Vancomycin Dosing by Pharmacy by Dr. Vo Siemens provided for this 80y.o. year old female , for indication of Sepsis. Day of Therapy 01    Ht Readings from Last 1 Encounters:   01/25/17 167.6 cm (66\")        Wt Readings from Last 1 Encounters:   01/25/17 73.5 kg (162 lb 1.6 oz)        Previous Regimen NA   Last Level NA   Other Current Antibiotics Zosyn 2.25 gm IV q6, Levaquin 500 mg IV q48   Significant Cultures Pending   Serum Creatinine Lab Results   Component Value Date/Time    Creatinine 1.87 01/26/2017 03:27 AM      Creatinine Clearance Estimated Creatinine Clearance: 18.5 mL/min (based on Cr of 1.87). BUN Lab Results   Component Value Date/Time    BUN 44 01/26/2017 03:27 AM      WBC Lab Results   Component Value Date/Time    WBC 12.9 01/26/2017 03:27 AM      H/H Lab Results   Component Value Date/Time    HGB 11.4 01/26/2017 03:27 AM      Platelets Lab Results   Component Value Date/Time    PLATELET 076 50/24/7150 03:27 AM      Temp 97.9 °F (36.6 °C)     Start Vancomycin therapy, with loading dose of 1 gm @ 17:00 [id number]. Follow with maintenance dose of 750 mg IV q24h, beginning @ 17:00 27Jan2017. Dose calculated to approximate a therapeutic trough of 15-20 mcg/mL. Will order a 24 hour random level due to poor renal.    Pharmacy to follow daily and will make changes to dose and/or frequency based on clinical status. Margarita Bahena, Pharm. D.   Clinical Pharmacist  [phone]

## 2017-01-27 NOTE — PROGRESS NOTES
Hospitalist Progress Note    Patient: Marie Eng MRN: [id number]  CSN: [id number]    YOB: 1921  Age: 80 y.o. Sex: female    DOA: 1/25/2017 LOS:  LOS: 2 days          Chief Complaint:    Acute Respiratory Failure      Assessment/Plan     Principal Problem:    Acute respiratory failure ([facility] (1/25/2017): Multifactorial: HCAP, Sepsis, and COPD exacerbation. Broad spectrum abx w/ sepsis bundle. PRN breathing treatments. Active Problems:    DM (diabetes mellitus) ([facility] (5/21/2012): Meeting inpatient goals. A-fib ([facility] (6/24/2012): Rate controlled for the most part. Monitor. Third degree heart block ([facility] (5/9/2015): Pacemaker in place. COPD with acute exacerbation ([facility] (8/21/2015): As above. UTI (urinary tract infection) (1/25/2017): Broad spectrum abx w/ double anti-pseudomonals given history of resistant infection. Sepsis ([facility] (1/25/2017): As above. MRSA bacteremia (1/27/2017): As above. HCAP (healthcare-associated pneumonia) (1/27/2017): As above. Long QT interval (1/27/2017): Avoid QT prolonging agents as much as possible. Check Mg and K+. Replete as needed. Given h/o resistant pseudomonal UTI sensitive to levaquin and current urinary infection, need to keep this going for the time being. When final C/S return, eliminate if possible. On beta blocker w/ pacemaker in place. Subjective:    No acute events overnight.        Review of systems:    Constitutional: denies fevers, chills, myalgias  Respiratory: + SOB, cough  Cardiovascular: denies chest pain, palpitations  Gastrointestinal: denies nausea, vomiting, diarrhea      Vital signs/Intake and Output:  Visit Vitals    BP (!) 136/91 (BP 1 Location: Right arm, BP Patient Position: At rest)    Pulse (!) 104    Temp 98.2 °F (36.8 °C)    Resp 18    Ht 5' 6\" (1.676 m)    Wt 69.9 kg (154 lb 1.6 oz)    SpO2 99%    BMI 24.87 kg/m2     Current Shift:     Last three shifts:  01/25 1901 - 01/27 0700  In: 1228.1 [I.V.:1228.1]  Out: -     Exam:    General: elderly WF alert, NAD  Head/Neck: O2 mask in place NCAT, supple, No masses, No lymphadenopathy  CVS:Regular rate and rhythm, no M/R/G, S1/S2 heard, no thrill  Lungs: Diffuse bilateral wheezes; no rales or rhonchi  Abdomen: Soft, Nontender, No distention, Normal Bowel sounds, No hepatomegaly  Extremities: No C/C/E, pulses palpable 2+  Skin:normal texture and turgor, no rashes, no lesions  Neuro:grossly normal , follows commands  Psych:appropriate                Labs: Results:       Chemistry Recent Labs      01/27/17 0215 01/26/17 0327 01/25/17   0940   GLU  131*  131*  144*   NA  141  139  137   K  3.9  4.8  4.2   CL  107  104  102   CO2  22  23  25   BUN  48*  44*  32*   CREA  1.62*  1.87*  1.55*   CA  8.5  8.5  9.1   AGAP  12  12  10   BUCR  30*  24*  21*   AP   --    --   58   TP   --    --   6.6   ALB   --    --   2.9*   GLOB   --    --   3.7   AGRAT   --    --   0.8      CBC w/Diff Recent Labs      01/27/17 0215 01/26/17 0327 01/25/17   0940   WBC  7.8  12.9  17.2*   RBC  3.45*  3.83*  4.38   HGB  10.3*  11.4*  13.2   HCT  32.1*  35.5  40.2   PLT  244  247  343   GRANS   --    --   83*   LYMPH   --    --   10*   EOS   --    --   0      Cardiac Enzymes Recent Labs      01/25/17   0940   CPK  40   CKND1  CANNOT BE CALCULATED      Coagulation Recent Labs      01/25/17   0940   PTP  15.9*   INR  1.3*   APTT  20.3*       Lipid Panel Lab Results   Component Value Date/Time    Cholesterol, total 147 06/24/2012 02:15 AM    HDL Cholesterol 46 06/24/2012 02:15 AM    LDL, calculated 78.8 06/24/2012 02:15 AM    VLDL, calculated 22.2 06/24/2012 02:15 AM    Triglyceride 111 06/24/2012 02:15 AM    CHOL/HDL Ratio 3.2 06/24/2012 02:15 AM      BNP No results for input(s): BNPP in the last 72 hours.    Liver Enzymes Recent Labs      01/25/17   0940   TP  6.6   ALB  2.9*   AP  58   SGOT  15      Thyroid Studies Lab Results Component Value Date/Time    TSH 1.31 05/10/2015 05:00 AM        Procedures/imaging: see electronic medical records for all procedures/Xrays and details which were not copied into this note but were reviewed prior to creation of Plan    40 minutes of critical care time spent in the direct evaluation and treatment of this high risk patient. The reason for providing this level of medical care for this critically ill patient was due a critical illness that impaired one or more vital organ systems such that there was a high probability of imminent or life threatening deterioration in the patients condition. This care involved high complexity decision making to assess, manipulate, and support vital system functions, to treat this degreee vital organ system failure and to prevent further life threatening deterioration of the patients condition.       Obed Pitts, DO

## 2017-01-27 NOTE — ROUTINE PROCESS
Bedside and Verbal shift change report given to MATTHEW Almaraz (oncoming nurse) by Alba Stanton   (offgoing nurse). Report included the following information SBAR, Procedure Summary, Intake/Output and MAR.        Pt had uneventful shift and tolerated medications well

## 2017-01-27 NOTE — PROGRESS NOTES
Pharmacy Dosing Services: [address] [facility] / [address]    Pharmacist Renal Dosing Progress Note for Dr. Irlanda Daniel    The following medication: [address] [facility] / [address] was automatically dose-adjusted per [facility] P&T Committee Protocol, with respect to renal function. Consult provided for this   80 y.o. , female , for the indication of HCAP    Pt Weight:   Wt Readings from Last 1 Encounters:   01/25/17 73.5 kg (162 lb 1.6 oz)         Previous Regimen NA   Serum Creatinine Lab Results   Component Value Date/Time    Creatinine 1.87 01/26/2017 03:27 AM       Creatinine Clearance Estimated Creatinine Clearance: 18.5 mL/min (based on Cr of 1.87). BUN Lab Results   Component Value Date/Time    BUN 44 01/26/2017 03:27 AM         Dosage changed to:    Zosyn 2.25 gm IV q6h    Levaquin 500 mg IV q48h    Pharmacy to continue to monitor patient daily. Will make dosage adjustments based upon changing renal function. Erik Stephenson, Pharm. D.   Clinical Pharmacist  [phone]

## 2017-01-28 LAB
ANION GAP BLD CALC-SCNC: 12 MMOL/L (ref 3–18)
BACTERIA SPEC CULT: NORMAL
BUN SERPL-MCNC: 50 MG/DL (ref 7–18)
BUN/CREAT SERPL: 36 (ref 12–20)
CALCIUM SERPL-MCNC: 8.6 MG/DL (ref 8.5–10.1)
CHLORIDE SERPL-SCNC: 110 MMOL/L (ref 100–108)
CO2 SERPL-SCNC: 20 MMOL/L (ref 21–32)
CREAT SERPL-MCNC: 1.38 MG/DL (ref 0.6–1.3)
GLUCOSE BLD STRIP.AUTO-MCNC: 128 MG/DL (ref 70–110)
GLUCOSE BLD STRIP.AUTO-MCNC: 129 MG/DL (ref 70–110)
GLUCOSE BLD STRIP.AUTO-MCNC: 134 MG/DL (ref 70–110)
GLUCOSE BLD STRIP.AUTO-MCNC: 136 MG/DL (ref 70–110)
GLUCOSE SERPL-MCNC: 138 MG/DL (ref 74–99)
POTASSIUM SERPL-SCNC: 3.9 MMOL/L (ref 3.5–5.5)
SERVICE CMNT-IMP: NORMAL
SODIUM SERPL-SCNC: 142 MMOL/L (ref 136–145)

## 2017-01-28 PROCEDURE — 82962 GLUCOSE BLOOD TEST: CPT

## 2017-01-28 PROCEDURE — 36415 COLL VENOUS BLD VENIPUNCTURE: CPT | Performed by: HOSPITALIST

## 2017-01-28 PROCEDURE — 74011000258 HC RX REV CODE- 258: Performed by: HOSPITALIST

## 2017-01-28 PROCEDURE — 74011000250 HC RX REV CODE- 250: Performed by: FAMILY MEDICINE

## 2017-01-28 PROCEDURE — 77010033678 HC OXYGEN DAILY

## 2017-01-28 PROCEDURE — 65270000029 HC RM PRIVATE

## 2017-01-28 PROCEDURE — 94660 CPAP INITIATION&MGMT: CPT

## 2017-01-28 PROCEDURE — 74011250637 HC RX REV CODE- 250/637: Performed by: HOSPITALIST

## 2017-01-28 PROCEDURE — 74011250636 HC RX REV CODE- 250/636: Performed by: HOSPITALIST

## 2017-01-28 PROCEDURE — 80048 BASIC METABOLIC PNL TOTAL CA: CPT | Performed by: HOSPITALIST

## 2017-01-28 RX ORDER — LOTEPREDNOL ETABONATE 5 MG/ML
1 SUSPENSION/ DROPS OPHTHALMIC DAILY
Status: DISCONTINUED | OUTPATIENT
Start: 2017-01-28 | End: 2017-02-02 | Stop reason: HOSPADM

## 2017-01-28 RX ADMIN — METHYLPREDNISOLONE SODIUM SUCCINATE 20 MG: 40 INJECTION, POWDER, FOR SOLUTION INTRAMUSCULAR; INTRAVENOUS at 20:41

## 2017-01-28 RX ADMIN — ESCITALOPRAM OXALATE 10 MG: 10 TABLET ORAL at 10:25

## 2017-01-28 RX ADMIN — DIFLUPREDNATE 1 DROP: 0.5 EMULSION OPHTHALMIC at 22:55

## 2017-01-28 RX ADMIN — OLOPATADINE HYDROCHLORIDE 1 DROP: 2 SOLUTION/ DROPS OPHTHALMIC at 10:28

## 2017-01-28 RX ADMIN — HEPARIN SODIUM 5000 UNITS: 5000 INJECTION, SOLUTION INTRAVENOUS; SUBCUTANEOUS at 05:08

## 2017-01-28 RX ADMIN — MINERAL OIL 1 DROP: 2; 2 EMULSION OPHTHALMIC at 10:31

## 2017-01-28 RX ADMIN — CARVEDILOL 12.5 MG: 12.5 TABLET, FILM COATED ORAL at 10:25

## 2017-01-28 RX ADMIN — ASPIRIN 81 MG: 81 TABLET, COATED ORAL at 10:25

## 2017-01-28 RX ADMIN — ATORVASTATIN CALCIUM 10 MG: 10 TABLET, FILM COATED ORAL at 10:25

## 2017-01-28 RX ADMIN — SODIUM CHLORIDE 750 MG: 900 INJECTION, SOLUTION INTRAVENOUS at 20:42

## 2017-01-28 RX ADMIN — BRIMONIDINE TARTRATE 1 DROP: 1 SOLUTION/ DROPS OPHTHALMIC at 22:58

## 2017-01-28 RX ADMIN — BRINZOLAMIDE 1 DROP: 10 SUSPENSION/ DROPS OPHTHALMIC at 10:25

## 2017-01-28 RX ADMIN — PILOCARPINE HYDROCHLORIDE 1 DROP: 10 SOLUTION/ DROPS OPHTHALMIC at 15:59

## 2017-01-28 RX ADMIN — SODIUM CHLORIDE 1 DROP: 50 SOLUTION OPHTHALMIC at 22:57

## 2017-01-28 RX ADMIN — PILOCARPINE HYDROCHLORIDE 1 DROP: 10 SOLUTION/ DROPS OPHTHALMIC at 10:27

## 2017-01-28 RX ADMIN — Medication 10 ML: at 10:37

## 2017-01-28 RX ADMIN — LOTEPREDNOL ETABONATE 1 DROP: 5 SUSPENSION/ DROPS OPHTHALMIC at 22:52

## 2017-01-28 RX ADMIN — HEPARIN SODIUM 5000 UNITS: 5000 INJECTION, SOLUTION INTRAVENOUS; SUBCUTANEOUS at 22:50

## 2017-01-28 RX ADMIN — SODIUM CHLORIDE 1 DROP: 50 SOLUTION OPHTHALMIC at 10:26

## 2017-01-28 RX ADMIN — PIPERACILLIN SODIUM,TAZOBACTAM SODIUM 2.25 G: 2; .25 INJECTION, POWDER, FOR SOLUTION INTRAVENOUS at 18:43

## 2017-01-28 RX ADMIN — DIFLUPREDNATE 1 DROP: 0.5 EMULSION OPHTHALMIC at 10:30

## 2017-01-28 RX ADMIN — LEVOFLOXACIN 500 MG: 5 INJECTION, SOLUTION INTRAVENOUS at 19:26

## 2017-01-28 RX ADMIN — PIPERACILLIN SODIUM,TAZOBACTAM SODIUM 2.25 G: 2; .25 INJECTION, POWDER, FOR SOLUTION INTRAVENOUS at 05:08

## 2017-01-28 RX ADMIN — BIMATOPROST 1 DROP: 0.1 SOLUTION/ DROPS OPHTHALMIC at 22:58

## 2017-01-28 RX ADMIN — CARVEDILOL 12.5 MG: 12.5 TABLET, FILM COATED ORAL at 18:43

## 2017-01-28 RX ADMIN — Medication 10 ML: at 15:08

## 2017-01-28 RX ADMIN — HEPARIN SODIUM 5000 UNITS: 5000 INJECTION, SOLUTION INTRAVENOUS; SUBCUTANEOUS at 15:06

## 2017-01-28 RX ADMIN — MINERAL OIL 1 DROP: 2; 2 EMULSION OPHTHALMIC at 18:50

## 2017-01-28 RX ADMIN — BRINZOLAMIDE 1 DROP: 10 SUSPENSION/ DROPS OPHTHALMIC at 22:58

## 2017-01-28 RX ADMIN — BRINZOLAMIDE 1 DROP: 10 SUSPENSION/ DROPS OPHTHALMIC at 16:00

## 2017-01-28 RX ADMIN — DIFLUPREDNATE 1 DROP: 0.5 EMULSION OPHTHALMIC at 16:00

## 2017-01-28 RX ADMIN — BRIMONIDINE TARTRATE 1 DROP: 1 SOLUTION/ DROPS OPHTHALMIC at 10:29

## 2017-01-28 RX ADMIN — PILOCARPINE HYDROCHLORIDE 1 DROP: 10 SOLUTION/ DROPS OPHTHALMIC at 05:09

## 2017-01-28 RX ADMIN — METHYLPREDNISOLONE SODIUM SUCCINATE 20 MG: 40 INJECTION, POWDER, FOR SOLUTION INTRAMUSCULAR; INTRAVENOUS at 10:22

## 2017-01-28 RX ADMIN — PILOCARPINE HYDROCHLORIDE 1 DROP: 10 SOLUTION/ DROPS OPHTHALMIC at 22:55

## 2017-01-28 RX ADMIN — MINERAL OIL 1 DROP: 2; 2 EMULSION OPHTHALMIC at 15:07

## 2017-01-28 RX ADMIN — PIPERACILLIN SODIUM,TAZOBACTAM SODIUM 2.25 G: 2; .25 INJECTION, POWDER, FOR SOLUTION INTRAVENOUS at 10:21

## 2017-01-28 RX ADMIN — MINERAL OIL 1 DROP: 2; 2 EMULSION OPHTHALMIC at 22:53

## 2017-01-28 RX ADMIN — Medication 10 ML: at 22:56

## 2017-01-28 RX ADMIN — BRIMONIDINE TARTRATE 1 DROP: 1 SOLUTION/ DROPS OPHTHALMIC at 16:00

## 2017-01-28 NOTE — PROGRESS NOTES
0730 - Bedside report received from Amisha Sheldon RN. Patient A&O x3. Resting comfortably with no c/o pain, sob, distress noted. Patient on VENTIMASK with 12L. Plan of care for today is decrease SOB and titrate O2.    0800 - Critical LAB. Patient blood cultures grown MRSA. Dr. Mirela Vergara Notified. No new orders. Patient on IV ABX.     1300 - Family at bedside. Reviewed info with family for need of gowning process for contact precautions. 1330 - Wound care at bedside. Dressing applied to sacrum area. Bedside and Verbal shift change report given to Valentino Nguyen RN (oncoming nurse) by Messi Sanon RN (off going nurse). Report included the following information SBAR, Kardex, Intake/Output, MAR and Recent Results.        Patient Vitals for the past 12 hrs:   Temp Pulse Resp BP SpO2   01/27/17 1931 98.1 °F (36.7 °C) 89 18 143/66 97 %   01/27/17 1645 97.2 °F (36.2 °C) 96 18 132/67 100 %

## 2017-01-28 NOTE — PROGRESS NOTES
Hospitalist Progress Note    Patient: Meliza Loja MRN: [id number]  [facility]: [id number]    YOB: 1921  Age: 80 y.o. Sex: female    DOA: 1/25/2017 LOS:  LOS: 3 days              IMPRESSION and Plan:    eMliza Loja is a 80 y.o. female with   Patient Active Problem List    Diagnosis Date Noted    MRSA bacteremia 01/27/2017    HCAP (healthcare-associated pneumonia) 01/27/2017    Long QT interval 01/27/2017    Bacteremia 01/26/2017    UTI (urinary tract infection) 01/25/2017    Pneumonia 01/25/2017    Respiratory distress 01/25/2017    COPD exacerbation ([address] 01/25/2017    Sepsis ([address] 01/25/2017    Acute respiratory failure ([address] 01/25/2017    COPD with acute exacerbation ([address] 08/21/2015    Third degree heart block ([address] 05/09/2015    Chronic airway obstruction, not elsewhere classified ([address] 06/24/2012    A-fib ([address] 06/24/2012    DM (diabetes mellitus) ([address] 05/21/2012    Hypertension      Principal Problem:    Acute respiratory failure ([address] (1/25/2017)    Active Problems:    Hypertension ()      DM (diabetes mellitus) ([address] (5/21/2012)      A-fib ([address] (6/24/2012)      Third degree heart block ([address] (5/9/2015)      COPD with acute exacerbation ([address] (8/21/2015)      UTI (urinary tract infection) (1/25/2017)      Sepsis ([address] (1/25/2017)      MRSA bacteremia (1/27/2017)      HCAP (healthcare-associated pneumonia) (1/27/2017)      Long QT interval (1/27/2017)        1) Sepis with pneumonia and uti - cont current abx    2) CKD stage 3 - cr is stable. Labs as ordered    3) copd exacerbation - cont IV solumedrol today. 4)  FEN - labs as ordered and reviewed    Patient's condition is stable      Recommend to continue hospitalization. Discussed with patient. Chief Complaints:   Chief Complaint   Patient presents with    Shortness of Breath     SUBJECTIVE:  Pt is seen and examined. Chart reviewed. Appears to be more sob    No CP     No Fever, chills, Nausea, vomitting. Review of systems:    General: No fevers or chills. Cardiovascular: No chest pain or pressure. No palpitations. Pulmonary: shortness of breath. Gastrointestinal: No nausea, vomiting    PE:  Patient Vitals for the past 24 hrs:   BP Temp Pulse Resp SpO2 Weight   01/28/17 0825 137/75 98.3 °F (36.8 °C) 72 20 100 % -   01/28/17 0317 149/67 97.5 °F (36.4 °C) 96 20 97 % -   01/27/17 2310 150/65 97.8 °F (36.6 °C) 61 20 95 % 71.4 kg (157 lb 6.4 oz)   01/27/17 1931 143/66 98.1 °F (36.7 °C) 89 18 97 % -   01/27/17 1645 132/67 97.2 °F (36.2 °C) 96 18 100 % -       Intake/Output Summary (Last 24 hours) at 01/28/17 1220  Last data filed at 01/28/17 0887   Gross per 24 hour   Intake              530 ml   Output                0 ml   Net              530 ml     Patient Vitals for the past 120 hrs:   Weight   01/25/17 0931 80.7 kg (178 lb)   01/25/17 2325 73.5 kg (162 lb 1.6 oz)   01/26/17 2257 69.9 kg (154 lb 1.6 oz)   01/27/17 2310 71.4 kg (157 lb 6.4 oz)           HEENT: Perrla, EOMI; oral mucosa well prefused; Conjunctiva not injected  Neck: No JVD, Negative carotid bruits, normal pulses; No thyromegaly  Resp: CTA bilaterally; No wheezes or rales  CV: RRR s1s2 No murmur; No rubs; PMI not displaced  Abd: Positive Bowel Sounds, Soft, Nontender  Ext: No clubbing; No cyanosis;  No edema      Intake and Output:  Current Shift:     Last three shifts:  01/26 1901 - 01/28 0700  In: 859.3 [I.V.:859.3]  Out: -     Lab/Data Reviewed:  Recent Results (from the past 8 hour(s))   GLUCOSE, POC    Collection Time: 01/28/17  7:09 AM   Result Value Ref Range    Glucose (POC) 128 (H) 70 - 110 mg/dL     Medications:  Current Facility-Administered Medications   Medication Dose Route Frequency    methylPREDNISolone (PF) (SOLU-MEDROL) injection 20 mg  20 mg IntraVENous Q12H    piperacillin-tazobactam (ZOSYN) 2.25 g in 0.9% sodium chloride (MBP/ADV) 50 mL MBP  2.25 g IntraVENous Q6H    levoFLOXacin (LEVAQUIN) 500 mg in D5W IVPB  500 mg IntraVENous Q48H    vancomycin (VANCOCIN) 750 mg in 0.9% sodium chloride (MBP/ADV) 250 mL ADV  750 mg IntraVENous Q24H    Vancomycin- Rx to Dose & Monitor  1 Each Other Rx Dosing/Monitoring    sodium chloride (NS) flush 5-10 mL  5-10 mL IntraVENous Q8H    sodium chloride (NS) flush 5-10 mL  5-10 mL IntraVENous PRN    0.9% sodium chloride infusion  40 mL/hr IntraVENous CONTINUOUS    albuterol-ipratropium (DUO-NEB) 2.5 MG-0.5 MG/3 ML  3 mL Nebulization Q4H PRN    aspirin delayed-release tablet 81 mg  81 mg Oral DAILY    atorvastatin (LIPITOR) tablet 10 mg  10 mg Oral DAILY    azithromycin (AZASITE) 1 % ophthalmic solution 1 Drop  1 Drop Left Eye QHS    bimatoprost (LUMIGAN) 0.01 % ophthalmic drops 1 Drop  1 Drop Right Eye QHS    brimonidine (ALPHAGAN P) 0.1 % ophthalmic solution 1 Drop  1 Drop Both Eyes TID    brinzolamide (AZOPT) 1 % ophthalmic suspension 1 Drop  1 Drop Both Eyes TID    carvedilol (COREG) tablet 12.5 mg  12.5 mg Oral BID WITH MEALS    [START ON 1/31/2017] Difluprednate (DUREZOL) 0.05 % ophthalmic emulsion 1 Drop  1 Drop Right Eye Q7D    Difluprednate (DUREZOL) 0.05 % ophthalmic emulsion 1 Drop  1 Drop Left Eye TID    escitalopram oxalate (LEXAPRO) tablet 10 mg  10 mg Oral DAILY    loteprednol etabonate 0.5 % drpg 1 Each  1 Each Right Eye DAILY    pilocarpine (PILOCAR) 1 % ophthalmic solution 1 Drop  1 Drop Right Eye Q6H    insulin lispro (HUMALOG) injection   SubCUTAneous AC&HS    glucose chewable tablet 16 g  4 Tab Oral PRN    glucagon (GLUCAGEN) injection 1 mg  1 mg IntraMUSCular PRN    dextrose (D50W) injection syrg 12.5-25 g  25-50 mL IntraVENous PRN    heparin (porcine) injection 5,000 Units  5,000 Units SubCUTAneous Q8H    olopatadine (PATADAY) ophthalmic solution 0.2 % -  1 Drop  1 Drop Both Eyes DAILY    sodium chloride (MAHESH-5) 5 % ophthalmic solution 1 Drop  1 Drop Left Eye BID    acetaminophen (TYLENOL) tablet 650 mg  650 mg Oral Q6H PRN    light mineral oil-min oil (PF) 0.5-0.5 % dpet 1 Drop  1 Drop Ophthalmic QID       Recent Results (from the past 24 hour(s))   GLUCOSE, POC    Collection Time: 01/27/17 12:26 PM   Result Value Ref Range    Glucose (POC) 133 (H) 70 - 110 mg/dL   VANCOMYCIN, RANDOM    Collection Time: 01/27/17  4:02 PM   Result Value Ref Range    VANCOMYCIN,RANDOM 8.8 5.0 - 40.0 UG/ML   GLUCOSE, POC    Collection Time: 01/27/17  4:49 PM   Result Value Ref Range    Glucose (POC) 133 (H) 70 - 110 mg/dL   GLUCOSE, POC    Collection Time: 01/27/17  9:34 PM   Result Value Ref Range    Glucose (POC) 130 (H) 70 - 933 mg/dL   METABOLIC PANEL, BASIC    Collection Time: 01/28/17  3:07 AM   Result Value Ref Range    Sodium 142 136 - 145 mmol/L    Potassium 3.9 3.5 - 5.5 mmol/L    Chloride 110 (H) 100 - 108 mmol/L    CO2 20 (L) 21 - 32 mmol/L    Anion gap 12 3.0 - 18 mmol/L    Glucose 138 (H) 74 - 99 mg/dL    BUN 50 (H) 7.0 - 18 MG/DL    Creatinine 1.38 (H) 0.6 - 1.3 MG/DL    BUN/Creatinine ratio 36 (H) 12 - 20      GFR est AA 43 (L) >60 ml/min/1.73m2    GFR est non-AA 36 (L) >60 ml/min/1.73m2    Calcium 8.6 8.5 - 10.1 MG/DL   GLUCOSE, POC    Collection Time: 01/28/17  7:09 AM   Result Value Ref Range    Glucose (POC) 128 (H) 70 - 110 mg/dL       Procedures/imaging: see electronic medical records for all procedures/Xrays and details which were not copied into this note but were reviewed prior to creation of Vesna Mccartney MD   1/28/2017, 12:20 PM

## 2017-01-28 NOTE — ROUTINE PROCESS
Bedside and Verbal shift change report given to Mary Vyas RN (oncoming nurse) by Donna Lopez   (offgoing nurse). Report included the following information SBAR, Intake/Output and MAR. Pt had uneventful shift and tolerated medications well.

## 2017-01-28 NOTE — PROGRESS NOTES
Patient had uneventful night. VSS. Denies pain. AMS. Bed alarm in place. Anxious at times. O2 remained WNL through out night with venti mask. Course lung sounds, non productive cough. No acute events. Patient Vitals for the past 12 hrs:   Temp Pulse Resp BP SpO2   01/28/17 0317 97.5 °F (36.4 °C) 96 20 149/67 97 %   01/27/17 2310 97.8 °F (36.6 °C) 61 20 150/65 95 %       Writtenl shift change report given to ANGI [address] [place] [address] (oncoming nurse) by Jane Cannon RN   (offgoing nurse). Report included the following information SBAR, Kardex and MAR.

## 2017-01-28 NOTE — PROGRESS NOTES
PATIENT REFUSED CPAP LAST NIGHT. SHE IS CURRENTLY ON 50% VM WITH SPO2 100%. SHE IS ASLEEP AND APPEARS COMFORTABLE.

## 2017-01-29 ENCOUNTER — APPOINTMENT (OUTPATIENT)
Dept: GENERAL RADIOLOGY | Age: 82
DRG: 871 | End: 2017-01-29
Attending: INTERNAL MEDICINE
Payer: MEDICARE

## 2017-01-29 LAB
ALBUMIN SERPL BCP-MCNC: 2 G/DL (ref 3.4–5)
ALBUMIN/GLOB SERPL: 0.6 {RATIO} (ref 0.8–1.7)
ALP SERPL-CCNC: 42 U/L (ref 45–117)
ALT SERPL-CCNC: 67 U/L (ref 13–56)
ANION GAP BLD CALC-SCNC: 10 MMOL/L (ref 3–18)
AST SERPL W P-5'-P-CCNC: 56 U/L (ref 15–37)
BASOPHILS # BLD AUTO: 0 K/UL (ref 0–0.06)
BASOPHILS # BLD: 0 % (ref 0–2)
BILIRUB SERPL-MCNC: 0.5 MG/DL (ref 0.2–1)
BUN SERPL-MCNC: 48 MG/DL (ref 7–18)
BUN/CREAT SERPL: 44 (ref 12–20)
CALCIUM SERPL-MCNC: 8.5 MG/DL (ref 8.5–10.1)
CHLORIDE SERPL-SCNC: 113 MMOL/L (ref 100–108)
CO2 SERPL-SCNC: 22 MMOL/L (ref 21–32)
CREAT SERPL-MCNC: 1.1 MG/DL (ref 0.6–1.3)
DATE LAST DOSE: NORMAL
DIFFERENTIAL METHOD BLD: ABNORMAL
EOSINOPHIL # BLD: 0 K/UL (ref 0–0.4)
EOSINOPHIL NFR BLD: 0 % (ref 0–5)
ERYTHROCYTE [DISTWIDTH] IN BLOOD BY AUTOMATED COUNT: 14.2 % (ref 11.6–14.5)
GLOBULIN SER CALC-MCNC: 3.2 G/DL (ref 2–4)
GLUCOSE BLD STRIP.AUTO-MCNC: 111 MG/DL (ref 70–110)
GLUCOSE BLD STRIP.AUTO-MCNC: 117 MG/DL (ref 70–110)
GLUCOSE BLD STRIP.AUTO-MCNC: 128 MG/DL (ref 70–110)
GLUCOSE BLD STRIP.AUTO-MCNC: 206 MG/DL (ref 70–110)
GLUCOSE SERPL-MCNC: 138 MG/DL (ref 74–99)
HCT VFR BLD AUTO: 33.5 % (ref 35–45)
HGB BLD-MCNC: 10.2 G/DL (ref 12–16)
LYMPHOCYTES # BLD AUTO: 14 % (ref 21–52)
LYMPHOCYTES # BLD: 1.2 K/UL (ref 0.9–3.6)
MAGNESIUM SERPL-MCNC: 2.1 MG/DL (ref 1.8–2.4)
MCH RBC QN AUTO: 29.4 PG (ref 24–34)
MCHC RBC AUTO-ENTMCNC: 30.4 G/DL (ref 31–37)
MCV RBC AUTO: 96.5 FL (ref 74–97)
MONOCYTES # BLD: 0.5 K/UL (ref 0.05–1.2)
MONOCYTES NFR BLD AUTO: 6 % (ref 3–10)
NEUTS SEG # BLD: 6.8 K/UL (ref 1.8–8)
NEUTS SEG NFR BLD AUTO: 80 % (ref 40–73)
PHOSPHATE SERPL-MCNC: 3.1 MG/DL (ref 2.5–4.9)
PLATELET # BLD AUTO: 215 K/UL (ref 135–420)
PMV BLD AUTO: 9.2 FL (ref 9.2–11.8)
POTASSIUM SERPL-SCNC: 4.7 MMOL/L (ref 3.5–5.5)
PROT SERPL-MCNC: 5.2 G/DL (ref 6.4–8.2)
RBC # BLD AUTO: 3.47 M/UL (ref 4.2–5.3)
REPORTED DOSE,DOSE: NORMAL UNITS
REPORTED DOSE/TIME,TMG: 2100
SODIUM SERPL-SCNC: 145 MMOL/L (ref 136–145)
TSH SERPL DL<=0.05 MIU/L-ACNC: 0.16 UIU/ML (ref 0.36–3.74)
VANCOMYCIN TROUGH SERPL-MCNC: 12.9 UG/ML (ref 10–20)
WBC # BLD AUTO: 8.5 K/UL (ref 4.6–13.2)

## 2017-01-29 PROCEDURE — 87040 BLOOD CULTURE FOR BACTERIA: CPT | Performed by: INTERNAL MEDICINE

## 2017-01-29 PROCEDURE — 74011636637 HC RX REV CODE- 636/637: Performed by: INTERNAL MEDICINE

## 2017-01-29 PROCEDURE — 80053 COMPREHEN METABOLIC PANEL: CPT | Performed by: INTERNAL MEDICINE

## 2017-01-29 PROCEDURE — 97161 PT EVAL LOW COMPLEX 20 MIN: CPT

## 2017-01-29 PROCEDURE — 84443 ASSAY THYROID STIM HORMONE: CPT | Performed by: INTERNAL MEDICINE

## 2017-01-29 PROCEDURE — 82962 GLUCOSE BLOOD TEST: CPT

## 2017-01-29 PROCEDURE — 36415 COLL VENOUS BLD VENIPUNCTURE: CPT | Performed by: INTERNAL MEDICINE

## 2017-01-29 PROCEDURE — 74011250636 HC RX REV CODE- 250/636: Performed by: HOSPITALIST

## 2017-01-29 PROCEDURE — 65270000029 HC RM PRIVATE

## 2017-01-29 PROCEDURE — 85025 COMPLETE CBC W/AUTO DIFF WBC: CPT | Performed by: INTERNAL MEDICINE

## 2017-01-29 PROCEDURE — 74011636637 HC RX REV CODE- 636/637: Performed by: HOSPITALIST

## 2017-01-29 PROCEDURE — 74011000258 HC RX REV CODE- 258: Performed by: HOSPITALIST

## 2017-01-29 PROCEDURE — 80202 ASSAY OF VANCOMYCIN: CPT | Performed by: HOSPITALIST

## 2017-01-29 PROCEDURE — 74011250637 HC RX REV CODE- 250/637: Performed by: HOSPITALIST

## 2017-01-29 PROCEDURE — 71010 XR CHEST PORT: CPT

## 2017-01-29 PROCEDURE — 77010033678 HC OXYGEN DAILY

## 2017-01-29 PROCEDURE — 83735 ASSAY OF MAGNESIUM: CPT | Performed by: INTERNAL MEDICINE

## 2017-01-29 PROCEDURE — 84100 ASSAY OF PHOSPHORUS: CPT | Performed by: INTERNAL MEDICINE

## 2017-01-29 RX ORDER — PREDNISONE 10 MG/1
10 TABLET ORAL 2 TIMES DAILY WITH MEALS
Status: DISCONTINUED | OUTPATIENT
Start: 2017-01-29 | End: 2017-02-02 | Stop reason: HOSPADM

## 2017-01-29 RX ADMIN — HEPARIN SODIUM 5000 UNITS: 5000 INJECTION, SOLUTION INTRAVENOUS; SUBCUTANEOUS at 14:23

## 2017-01-29 RX ADMIN — BRIMONIDINE TARTRATE 1 DROP: 1 SOLUTION/ DROPS OPHTHALMIC at 17:01

## 2017-01-29 RX ADMIN — HEPARIN SODIUM 5000 UNITS: 5000 INJECTION, SOLUTION INTRAVENOUS; SUBCUTANEOUS at 21:41

## 2017-01-29 RX ADMIN — MINERAL OIL 1 DROP: 2; 2 EMULSION OPHTHALMIC at 09:31

## 2017-01-29 RX ADMIN — METHYLPREDNISOLONE SODIUM SUCCINATE 20 MG: 40 INJECTION, POWDER, FOR SOLUTION INTRAMUSCULAR; INTRAVENOUS at 09:25

## 2017-01-29 RX ADMIN — PILOCARPINE HYDROCHLORIDE 1 DROP: 10 SOLUTION/ DROPS OPHTHALMIC at 04:44

## 2017-01-29 RX ADMIN — CARVEDILOL 12.5 MG: 12.5 TABLET, FILM COATED ORAL at 09:25

## 2017-01-29 RX ADMIN — Medication 10 ML: at 15:23

## 2017-01-29 RX ADMIN — SODIUM CHLORIDE 1 DROP: 50 SOLUTION OPHTHALMIC at 09:34

## 2017-01-29 RX ADMIN — ATORVASTATIN CALCIUM 10 MG: 10 TABLET, FILM COATED ORAL at 09:25

## 2017-01-29 RX ADMIN — MINERAL OIL 1 DROP: 2; 2 EMULSION OPHTHALMIC at 14:24

## 2017-01-29 RX ADMIN — PIPERACILLIN SODIUM,TAZOBACTAM SODIUM 2.25 G: 2; .25 INJECTION, POWDER, FOR SOLUTION INTRAVENOUS at 13:15

## 2017-01-29 RX ADMIN — DIFLUPREDNATE 1 DROP: 0.5 EMULSION OPHTHALMIC at 15:25

## 2017-01-29 RX ADMIN — PILOCARPINE HYDROCHLORIDE 1 DROP: 10 SOLUTION/ DROPS OPHTHALMIC at 09:34

## 2017-01-29 RX ADMIN — BRINZOLAMIDE 1 DROP: 10 SUSPENSION/ DROPS OPHTHALMIC at 15:44

## 2017-01-29 RX ADMIN — BRIMONIDINE TARTRATE 1 DROP: 1 SOLUTION/ DROPS OPHTHALMIC at 21:47

## 2017-01-29 RX ADMIN — INSULIN LISPRO 4 UNITS: 100 INJECTION, SOLUTION INTRAVENOUS; SUBCUTANEOUS at 16:59

## 2017-01-29 RX ADMIN — PILOCARPINE HYDROCHLORIDE 1 DROP: 10 SOLUTION/ DROPS OPHTHALMIC at 21:45

## 2017-01-29 RX ADMIN — DIFLUPREDNATE 1 DROP: 0.5 EMULSION OPHTHALMIC at 09:00

## 2017-01-29 RX ADMIN — PIPERACILLIN SODIUM,TAZOBACTAM SODIUM 2.25 G: 2; .25 INJECTION, POWDER, FOR SOLUTION INTRAVENOUS at 00:33

## 2017-01-29 RX ADMIN — PIPERACILLIN SODIUM,TAZOBACTAM SODIUM 2.25 G: 2; .25 INJECTION, POWDER, FOR SOLUTION INTRAVENOUS at 07:35

## 2017-01-29 RX ADMIN — SODIUM CHLORIDE 1 DROP: 50 SOLUTION OPHTHALMIC at 22:07

## 2017-01-29 RX ADMIN — ESCITALOPRAM OXALATE 10 MG: 10 TABLET ORAL at 09:25

## 2017-01-29 RX ADMIN — MINERAL OIL 1 DROP: 2; 2 EMULSION OPHTHALMIC at 17:10

## 2017-01-29 RX ADMIN — ASPIRIN 81 MG: 81 TABLET, COATED ORAL at 09:25

## 2017-01-29 RX ADMIN — BRINZOLAMIDE 1 DROP: 10 SUSPENSION/ DROPS OPHTHALMIC at 09:34

## 2017-01-29 RX ADMIN — OLOPATADINE HYDROCHLORIDE 1 DROP: 2 SOLUTION/ DROPS OPHTHALMIC at 09:35

## 2017-01-29 RX ADMIN — PREDNISONE 10 MG: 10 TABLET ORAL at 16:59

## 2017-01-29 RX ADMIN — CARVEDILOL 12.5 MG: 12.5 TABLET, FILM COATED ORAL at 16:59

## 2017-01-29 RX ADMIN — PILOCARPINE HYDROCHLORIDE 1 DROP: 10 SOLUTION/ DROPS OPHTHALMIC at 15:21

## 2017-01-29 RX ADMIN — HEPARIN SODIUM 5000 UNITS: 5000 INJECTION, SOLUTION INTRAVENOUS; SUBCUTANEOUS at 07:36

## 2017-01-29 RX ADMIN — BRIMONIDINE TARTRATE 1 DROP: 1 SOLUTION/ DROPS OPHTHALMIC at 09:00

## 2017-01-29 RX ADMIN — DIFLUPREDNATE 1 DROP: 0.5 EMULSION OPHTHALMIC at 22:11

## 2017-01-29 RX ADMIN — BIMATOPROST 1 DROP: 0.1 SOLUTION/ DROPS OPHTHALMIC at 21:46

## 2017-01-29 RX ADMIN — PIPERACILLIN SODIUM,TAZOBACTAM SODIUM 2.25 G: 2; .25 INJECTION, POWDER, FOR SOLUTION INTRAVENOUS at 18:44

## 2017-01-29 RX ADMIN — BRINZOLAMIDE 1 DROP: 10 SUSPENSION/ DROPS OPHTHALMIC at 22:05

## 2017-01-29 NOTE — PROGRESS NOTES
Informed by nurse patient wanted to go on CPAP for the night. Went to place patient on CPAP but patient refused complaining it was too much air. Placed patient back on venti mask. Immediately after exiting patient room, patient wanted to go back on CPAP. Informed nurse I would place patient on CPAP later tonight when it was closer to her going to sleep in the hopes she would tolerate it better and where the machine longer.

## 2017-01-29 NOTE — CONSULTS
ID Consult  Dictated #[id number]    D/w daughter at bedside   Patient with limited (if any) visible improvement since admission   Patient repeatedly stated she doesn't want to be here and wants to go home   D/w daughter patient may be best served with change in goals towards comfort primarily     Desires palliative care consultation   Could consider change to levoflox/ clinda po x 14-21 days and return to nursing home    Thanks.   Nestor Dillon MD

## 2017-01-29 NOTE — PROGRESS NOTES
Hospitalist Progress Note    Patient: Meliza Loja MRN: [id number]  CSN: [id number]    YOB: 1921  Age: 80 y.o. Sex: female    DOA: 1/25/2017 LOS:  LOS: 4 days              IMPRESSION and Plan:    Meliza Loja is a 80 y.o. female with   Patient Active Problem List    Diagnosis Date Noted    MRSA bacteremia 01/27/2017    HCAP (healthcare-associated pneumonia) 01/27/2017    Long QT interval 01/27/2017    Bacteremia 01/26/2017    UTI (urinary tract infection) 01/25/2017    Pneumonia 01/25/2017    Respiratory distress 01/25/2017    COPD exacerbation ([address] 01/25/2017    Sepsis ([address] 01/25/2017    Acute respiratory failure ([address] 01/25/2017    COPD with acute exacerbation ([address] 08/21/2015    Third degree heart block ([address] 05/09/2015    Chronic airway obstruction, not elsewhere classified ([address] 06/24/2012    A-fib ([address] 06/24/2012    DM (diabetes mellitus) ([address] 05/21/2012    Hypertension      Principal Problem:    Acute respiratory failure ([address] (1/25/2017)    Active Problems:    Hypertension ()      DM (diabetes mellitus) ([address] (5/21/2012)      A-fib ([address] (6/24/2012)      Third degree heart block ([address] (5/9/2015)      COPD with acute exacerbation ([address] (8/21/2015)      UTI (urinary tract infection) (1/25/2017)      Sepsis ([address] (1/25/2017)      MRSA bacteremia (1/27/2017)      HCAP (healthcare-associated pneumonia) (1/27/2017)      Long QT interval (1/27/2017)        1) Sepis with pneumonia and uti and MRSA bacteremia-  Repeat BC. ID cosnult . Repeat cxr today. Dc levaquin. Cont IV vanc and zosyn    2) CKD stage 3 - cr is improving    3) copd exacerbation - change to po prednisone    4)  FEN - labs as ordered and reviewed    Patient's condition is stable      Recommend to continue hospitalization. Discussed with patient. Chief Complaints:   Chief Complaint   Patient presents with    Shortness of Breath     SUBJECTIVE:  Pt is seen and examined.   \" I feel terrible today\" but when asked, she denies any specific pain. Breathing appears to be improving. No CP     No Fever, chills, Nausea, vomitting. Review of systems:    General: No fevers or chills. Cardiovascular: No chest pain or pressure. No palpitations. Pulmonary: shortness of breath. Gastrointestinal: No nausea, vomiting    PE:  Patient Vitals for the past 24 hrs:   BP Temp Pulse Resp SpO2   01/29/17 0815 166/75 97.5 °F (36.4 °C) 87 20 100 %   01/29/17 0432 149/68 98.2 °F (36.8 °C) 84 20 100 %   01/28/17 2314 154/61 98.8 °F (37.1 °C) 62 20 100 %   01/28/17 1921 154/84 97.6 °F (36.4 °C) 78 20 97 %   01/28/17 1628 - - - - 100 %   01/28/17 1444 140/66 98.1 °F (36.7 °C) 60 20 100 %       Intake/Output Summary (Last 24 hours) at 01/29/17 1118  Last data filed at 01/29/17 0033   Gross per 24 hour   Intake              650 ml   Output                0 ml   Net              650 ml     Patient Vitals for the past 120 hrs:   Weight   01/25/17 0931 80.7 kg (178 lb)   01/25/17 2325 73.5 kg (162 lb 1.6 oz)   01/26/17 2257 69.9 kg (154 lb 1.6 oz)   01/27/17 2310 71.4 kg (157 lb 6.4 oz)           HEENT: Perrla, EOMI; oral mucosa well prefused; Conjunctiva not injected  Neck: No JVD, Negative carotid bruits, normal pulses; No thyromegaly  Resp: CTA bilaterally; No wheezes or rales  CV: RRR s1s2 No murmur; No rubs; PMI not displaced  Abd: Positive Bowel Sounds, Soft, Nontender  Ext: No clubbing; No cyanosis; No edema      Intake and Output:  Current Shift:     Last three shifts:  01/27 1901 - 01/29 0700  In: 1180 [P.O.:120;  I.V.:1060]  Out: -     Lab/Data Reviewed:  Recent Results (from the past 8 hour(s))   GLUCOSE, POC    Collection Time: 01/29/17  8:17 AM   Result Value Ref Range    Glucose (POC) 128 (H) 70 - 110 mg/dL     Medications:  Current Facility-Administered Medications   Medication Dose Route Frequency    Vancomycin trough level due tonight @ 2030 ( 30 minutes prior to 2100 dose )   1 Each Other ONCE    loteprednol etabonate (LOTEMAX) 0.5 % ophthalmic suspension 1 Drop  1 Drop Right Eye DAILY    methylPREDNISolone (PF) (SOLU-MEDROL) injection 20 mg  20 mg IntraVENous Q12H    piperacillin-tazobactam (ZOSYN) 2.25 g in 0.9% sodium chloride (MBP/ADV) 50 mL MBP  2.25 g IntraVENous Q6H    levoFLOXacin (LEVAQUIN) 500 mg in D5W IVPB  500 mg IntraVENous Q48H    vancomycin (VANCOCIN) 750 mg in 0.9% sodium chloride (MBP/ADV) 250 mL ADV  750 mg IntraVENous Q24H    Vancomycin- Rx to Dose & Monitor  1 Each Other Rx Dosing/Monitoring    sodium chloride (NS) flush 5-10 mL  5-10 mL IntraVENous Q8H    sodium chloride (NS) flush 5-10 mL  5-10 mL IntraVENous PRN    0.9% sodium chloride infusion  40 mL/hr IntraVENous CONTINUOUS    albuterol-ipratropium (DUO-NEB) 2.5 MG-0.5 MG/3 ML  3 mL Nebulization Q4H PRN    aspirin delayed-release tablet 81 mg  81 mg Oral DAILY    atorvastatin (LIPITOR) tablet 10 mg  10 mg Oral DAILY    azithromycin (AZASITE) 1 % ophthalmic solution 1 Drop  1 Drop Left Eye QHS    bimatoprost (LUMIGAN) 0.01 % ophthalmic drops 1 Drop  1 Drop Right Eye QHS    brimonidine (ALPHAGAN P) 0.1 % ophthalmic solution 1 Drop  1 Drop Both Eyes TID    brinzolamide (AZOPT) 1 % ophthalmic suspension 1 Drop  1 Drop Both Eyes TID    carvedilol (COREG) tablet 12.5 mg  12.5 mg Oral BID WITH MEALS    [START ON 1/31/2017] Difluprednate (DUREZOL) 0.05 % ophthalmic emulsion 1 Drop  1 Drop Right Eye Q7D    Difluprednate (DUREZOL) 0.05 % ophthalmic emulsion 1 Drop  1 Drop Left Eye TID    escitalopram oxalate (LEXAPRO) tablet 10 mg  10 mg Oral DAILY    pilocarpine (PILOCAR) 1 % ophthalmic solution 1 Drop  1 Drop Right Eye Q6H    insulin lispro (HUMALOG) injection   SubCUTAneous AC&HS    glucose chewable tablet 16 g  4 Tab Oral PRN    glucagon (GLUCAGEN) injection 1 mg  1 mg IntraMUSCular PRN    dextrose (D50W) injection syrg 12.5-25 g  25-50 mL IntraVENous PRN    heparin (porcine) injection 5,000 Units  5,000 Units SubCUTAneous Q8H    olopatadine (PATADAY) ophthalmic solution 0.2 % -  1 Drop  1 Drop Both Eyes DAILY    sodium chloride (MAHESH-5) 5 % ophthalmic solution 1 Drop  1 Drop Left Eye BID    acetaminophen (TYLENOL) tablet 650 mg  650 mg Oral Q6H PRN    light mineral oil-min oil (PF) 0.5-0.5 % dpet 1 Drop  1 Drop Ophthalmic QID       Recent Results (from the past 24 hour(s))   GLUCOSE, POC    Collection Time: 01/28/17 12:51 PM   Result Value Ref Range    Glucose (POC) 134 (H) 70 - 110 mg/dL   GLUCOSE, POC    Collection Time: 01/28/17  5:22 PM   Result Value Ref Range    Glucose (POC) 129 (H) 70 - 110 mg/dL   GLUCOSE, POC    Collection Time: 01/28/17  9:18 PM   Result Value Ref Range    Glucose (POC) 136 (H) 70 - 110 mg/dL   CBC WITH AUTOMATED DIFF    Collection Time: 01/29/17  1:52 AM   Result Value Ref Range    WBC 8.5 4.6 - 13.2 K/uL    RBC 3.47 (L) 4.20 - 5.30 M/uL    HGB 10.2 (L) 12.0 - 16.0 g/dL    HCT 33.5 (L) 35.0 - 45.0 %    MCV 96.5 74.0 - 97.0 FL    MCH 29.4 24.0 - 34.0 PG    MCHC 30.4 (L) 31.0 - 37.0 g/dL    RDW 14.2 11.6 - 14.5 %    PLATELET 138 694 - 029 K/uL    MPV 9.2 9.2 - 11.8 FL    NEUTROPHILS 80 (H) 40 - 73 %    LYMPHOCYTES 14 (L) 21 - 52 %    MONOCYTES 6 3 - 10 %    EOSINOPHILS 0 0 - 5 %    BASOPHILS 0 0 - 2 %    ABS. NEUTROPHILS 6.8 1.8 - 8.0 K/UL    ABS. LYMPHOCYTES 1.2 0.9 - 3.6 K/UL    ABS. MONOCYTES 0.5 0.05 - 1.2 K/UL    ABS. EOSINOPHILS 0.0 0.0 - 0.4 K/UL    ABS.  BASOPHILS 0.0 0.0 - 0.06 K/UL    DF AUTOMATED     MAGNESIUM    Collection Time: 01/29/17  1:52 AM   Result Value Ref Range    Magnesium 2.1 1.8 - 2.4 mg/dL   METABOLIC PANEL, COMPREHENSIVE    Collection Time: 01/29/17  1:52 AM   Result Value Ref Range    Sodium 145 136 - 145 mmol/L    Potassium 4.7 3.5 - 5.5 mmol/L    Chloride 113 (H) 100 - 108 mmol/L    CO2 22 21 - 32 mmol/L    Anion gap 10 3.0 - 18 mmol/L    Glucose 138 (H) 74 - 99 mg/dL    BUN 48 (H) 7.0 - 18 MG/DL    Creatinine 1.10 0.6 - 1.3 MG/DL    BUN/Creatinine ratio 44 (H) 12 - 20 GFR est AA 56 (L) >60 ml/min/1.73m2    GFR est non-AA 46 (L) >60 ml/min/1.73m2    Calcium 8.5 8.5 - 10.1 MG/DL    Bilirubin, total 0.5 0.2 - 1.0 MG/DL    ALT 67 (H) 13 - 56 U/L    AST 56 (H) 15 - 37 U/L    Alk.  phosphatase 42 (L) 45 - 117 U/L    Protein, total 5.2 (L) 6.4 - 8.2 g/dL    Albumin 2.0 (L) 3.4 - 5.0 g/dL    Globulin 3.2 2.0 - 4.0 g/dL    A-G Ratio 0.6 (L) 0.8 - 1.7     PHOSPHORUS    Collection Time: 01/29/17  1:52 AM   Result Value Ref Range    Phosphorus 3.1 2.5 - 4.9 MG/DL   GLUCOSE, POC    Collection Time: 01/29/17  8:17 AM   Result Value Ref Range    Glucose (POC) 128 (H) 70 - 110 mg/dL       Procedures/imaging: see electronic medical records for all procedures/Xrays and details which were not copied into this note but were reviewed prior to creation of Chandana Vincent MD   1/29/2017, 12:20 PM

## 2017-01-29 NOTE — PROGRESS NOTES
Shift summary:    1440 Bedside report received from TERRI Blnut RN. 1530 Physical assessment completed. No change from previous finding. Pt had chest X-ray done. All eye drops were accounted for and the scheduled eye drops were administered. The bag with eyedrops kept in the patient specific bin, and returned in the bin after each use. Pt on continuous pulse oximetry, within normal level.

## 2017-01-29 NOTE — ROUTINE PROCESS
Bedside and Verbal shift change report given to KAYLEE Rios RN (oncoming nurse) by Grayson Damico RN (offgoing nurse). Report included the following information SBAR, Kardex, Intake/Output, MAR and Accordion.     Visit Vitals    /75    Pulse 87    Temp 97.5 °F (36.4 °C)    Resp 20    Ht 5' 6\" (1.676 m)    Wt 71.4 kg (157 lb 6.4 oz)    SpO2 100%    BMI 25.41 kg/m2

## 2017-01-29 NOTE — PROGRESS NOTES
Shift summary: No acute events. Patient answers questions appropriately. Anxious at times. Bilateral lungs diminished O2 stable on ventimask. Refused CPAP per RT. Denied pain. Incontinent, changed PRN.     Patient Vitals for the past 8 hrs:   Temp Pulse Resp BP SpO2   01/29/17 0432 98.2 °F (36.8 °C) 84 20 149/68 100 %   01/28/17 2314 98.8 °F (37.1 °C) 62 20 154/61 100 %

## 2017-01-29 NOTE — PROGRESS NOTES
Problem: Mobility Impaired (Adult and Pediatric)  Goal: *Acute Goals and Plan of Care (Insert Text)  Outcome: Not Met  PHYSICAL THERAPY EVALUATION & DISCHARGE     Patient: Jay Florez (89 y.o. female)  Date: 1/29/2017  Primary Diagnosis: Respiratory distress  Pneumonia  Sepsis ([address]  UTI (urinary tract infection)  COPD exacerbation ([address]  Precautions: Fall, DNR      ASSESSMENT AND RECOMMENDATIONS:  Based on the objective data described below, the patient presents with current level of function at [facility] as she is a prior resident of LTC. Pt required total assist for bed mobility at this time and following few commands for mobility or therex. Left pt in bed with all needs met and call bell within reach. At this time pt is not a candidate for acute care PT. Skilled physical therapy is not indicated at this time. Discharge Recommendations: return to LTC  Further Equipment Recommendations for Discharge: hospital bed and N/A        SUBJECTIVE:   Patient stated Everything is awful.       OBJECTIVE DATA SUMMARY:       Past Medical History   Diagnosis Date    Adhesive capsulitis      Anemia      Anxiety      Arthritis      Asthma      Atrial fibrillation ([address]      CAD (coronary artery disease)      COPD      Diabetes (HCC)      Diverticulosis      GERD (gastroesophageal reflux disease)      Hypertension      Osteoporosis      Peripheral neuropathy ([address]      Psychiatric disorder         depression    Thromboembolus ([address]      Unspecified sleep apnea         no longer wears CPAP.      Past Surgical History   Procedure Laterality Date    Hx colectomy        Hx appendectomy        Hx gyn           hysterectomy    Hx heent           tonsillectomy    Hx hemorrhoidectomy        Hx corneal transplant         Barriers to Learning/Limitations: yes;  physical  Compensate with: visual, verbal, tactile, kinesthetic cues/model  Prior Level of Function/Home Situation: Pt was a resident of LTC prior to hospitalization  Home Situation  Home Environment: Skilled nursing facility  One/Two Story Residence: Other (Comment) (SNF)  Living Alone: No  Support Systems: Skilled nursing facility, Family member(s)  Patient Expects to be Discharged to[de-identified] Skilled nursing facility  Current DME Used/Available at Home: Wheelchair  Critical Behavior:  Neurologic State: Alert  Orientation Level: Oriented X4  Cognition: Follows commands   Psychosocial  Patient Behaviors: Anxious; Agitated  Family  Behaviors: Calm; Cooperative  Purposeful Interaction: Yes  Pt Identified Daily Priority: Clinical issues (comment)  Gerry Process: Establish trust  Caring Interventions: Reassure  Reassure: Informing  Therapeutic Modalities: Intentional therapeutic touch  Skin Condition/Temp: Warm  Family  Behaviors: Calm; Cooperative  Skin Integrity: Wound (add Wound LDA)  Skin Integumentary  Skin Color: Appropriate for ethnicity  Skin Condition/Temp: Warm  Skin Integrity: Wound (add Wound LDA)  Turgor: Epidermis thin w/ loss of subcut tissue  Hair Growth: Present  Varicosities: Absent   Strength:    Strength: Generally decreased, functional   Range Of Motion:  AROM: Generally decreased, functional   PROM: Generally decreased, functional   Functional Mobility:  Bed Mobility:  Rolling: Total assistance    Pain:  Pain Scale 1: Numeric (0 - 10)  Pain Intensity 1: 0   Activity Tolerance:   Poor  Please refer to the flowsheet for vital signs taken during this treatment. After treatment:   [ ]         Patient left in no apparent distress sitting up in chair  [X]         Patient left in no apparent distress in bed  [X]         Call bell left within reach  [X]         Nursing notified  [ ]         Caregiver present  [X]         Bed alarm activated      COMMUNICATION/EDUCATION:   [X]         Fall prevention education was provided and the patient/caregiver indicated understanding.   [X]         Patient/family have participated as able in goal setting and plan of care.  [X]         Patient/family agree to work toward stated goals and plan of care. [ ]         Patient understands intent and goals of therapy, but is neutral about his/her participation. [ ]         Patient is unable to participate in goal setting and plan of care.      Thank you for this referral.  Bossman Cobb, DPT      Time Calculation: 13 mins

## 2017-01-29 NOTE — PROGRESS NOTES
PT    PT orders received and chart reviewed. Attempted PT evaluation at this time, however phlebotomist and nursing staff in pt's room.     Tony Villanueva PT, DPT

## 2017-01-29 NOTE — ROUTINE PROCESS
Verbal shift change report given to Obdulio Thomas RN (oncoming nurse) by Jose Faustin   (offgoing nurse). Report included the following information SBAR, Kardex and MAR.

## 2017-01-29 NOTE — PROGRESS NOTES
Received report from Jodee Rhodes RN.      2485 patient assessment was completed at this time. Attempted to feed the patient she stated that all she wanted was the Milk. All eye drops were accounted for and ones that were due were placed in the patients eyes. Eye drops were placed back in the patient specific bin. 1310 patient refused lunch. 1430 patients family was in the informed them that the patient had not eaten for us. That daughter ordered food that the patient was going to like and help her eat. Shift summary- rest of the shift was uneventful.

## 2017-01-29 NOTE — PROGRESS NOTES
Shift summary: Pt. Rested in bed with call light within reach, family bedside throughout most of shift. Venti mask in use. Oral care provided. Pt. Turned/ repositioned and made comfortable, brief changed as needed. Order received for Lotemax daily eye drop which was given. Pt. Anxious at times, reoriented to room and situation. No signs of pain or distress. On tele box 40 for continuous pulse ox. SATs remained WNL %.      Patient Vitals for the past 12 hrs:   Temp Pulse Resp BP SpO2   01/28/17 2314 98.8 °F (37.1 °C) 62 20 154/61 100 %   01/28/17 1921 97.6 °F (36.4 °C) 78 20 154/84 97 %   01/28/17 1628 - - - - 100 %   01/28/17 1444 98.1 °F (36.7 °C) 60 20 140/66 100 %

## 2017-01-29 NOTE — ROUTINE PROCESS
Bedside and Verbal shift change report given to TRERI Payne RN (oncoming nurse) by Юлия Lee RN   (offgoing nurse). Report included the following information SBAR, Kardex and MAR.

## 2017-01-30 PROBLEM — R53.81 DEBILITY: Status: ACTIVE | Noted: 2017-01-30

## 2017-01-30 PROBLEM — R06.00 DYSPNEA: Status: ACTIVE | Noted: 2017-01-30

## 2017-01-30 LAB
ALBUMIN SERPL BCP-MCNC: 2.3 G/DL (ref 3.4–5)
ALBUMIN/GLOB SERPL: 0.8 {RATIO} (ref 0.8–1.7)
ALP SERPL-CCNC: 38 U/L (ref 45–117)
ALT SERPL-CCNC: 47 U/L (ref 13–56)
ANION GAP BLD CALC-SCNC: 10 MMOL/L (ref 3–18)
AST SERPL W P-5'-P-CCNC: 16 U/L (ref 15–37)
BASOPHILS # BLD AUTO: 0 K/UL (ref 0–0.06)
BASOPHILS # BLD: 0 % (ref 0–2)
BILIRUB SERPL-MCNC: 0.5 MG/DL (ref 0.2–1)
BUN SERPL-MCNC: 40 MG/DL (ref 7–18)
BUN/CREAT SERPL: 37 (ref 12–20)
CALCIUM SERPL-MCNC: 8.6 MG/DL (ref 8.5–10.1)
CHLORIDE SERPL-SCNC: 113 MMOL/L (ref 100–108)
CO2 SERPL-SCNC: 26 MMOL/L (ref 21–32)
CREAT SERPL-MCNC: 1.08 MG/DL (ref 0.6–1.3)
DIFFERENTIAL METHOD BLD: ABNORMAL
EOSINOPHIL # BLD: 0 K/UL (ref 0–0.4)
EOSINOPHIL NFR BLD: 0 % (ref 0–5)
ERYTHROCYTE [DISTWIDTH] IN BLOOD BY AUTOMATED COUNT: 14 % (ref 11.6–14.5)
GLOBULIN SER CALC-MCNC: 2.9 G/DL (ref 2–4)
GLUCOSE BLD STRIP.AUTO-MCNC: 108 MG/DL (ref 70–110)
GLUCOSE SERPL-MCNC: 120 MG/DL (ref 74–99)
HCT VFR BLD AUTO: 32.8 % (ref 35–45)
HGB BLD-MCNC: 10.2 G/DL (ref 12–16)
LYMPHOCYTES # BLD AUTO: 18 % (ref 21–52)
LYMPHOCYTES # BLD: 1.7 K/UL (ref 0.9–3.6)
MAGNESIUM SERPL-MCNC: 1.9 MG/DL (ref 1.8–2.4)
MCH RBC QN AUTO: 29.5 PG (ref 24–34)
MCHC RBC AUTO-ENTMCNC: 31.1 G/DL (ref 31–37)
MCV RBC AUTO: 94.8 FL (ref 74–97)
MONOCYTES # BLD: 1 K/UL (ref 0.05–1.2)
MONOCYTES NFR BLD AUTO: 10 % (ref 3–10)
NEUTS SEG # BLD: 6.9 K/UL (ref 1.8–8)
NEUTS SEG NFR BLD AUTO: 72 % (ref 40–73)
PHOSPHATE SERPL-MCNC: 2.9 MG/DL (ref 2.5–4.9)
PLATELET # BLD AUTO: 236 K/UL (ref 135–420)
PMV BLD AUTO: 9.5 FL (ref 9.2–11.8)
POTASSIUM SERPL-SCNC: 3.3 MMOL/L (ref 3.5–5.5)
PROT SERPL-MCNC: 5.2 G/DL (ref 6.4–8.2)
RBC # BLD AUTO: 3.46 M/UL (ref 4.2–5.3)
SODIUM SERPL-SCNC: 149 MMOL/L (ref 136–145)
WBC # BLD AUTO: 9.4 K/UL (ref 4.6–13.2)

## 2017-01-30 PROCEDURE — 74011636637 HC RX REV CODE- 636/637: Performed by: INTERNAL MEDICINE

## 2017-01-30 PROCEDURE — 74011000250 HC RX REV CODE- 250: Performed by: HOSPITALIST

## 2017-01-30 PROCEDURE — 36415 COLL VENOUS BLD VENIPUNCTURE: CPT | Performed by: INTERNAL MEDICINE

## 2017-01-30 PROCEDURE — 83735 ASSAY OF MAGNESIUM: CPT | Performed by: INTERNAL MEDICINE

## 2017-01-30 PROCEDURE — 74011000258 HC RX REV CODE- 258: Performed by: HOSPITALIST

## 2017-01-30 PROCEDURE — 74011250637 HC RX REV CODE- 250/637: Performed by: FAMILY MEDICINE

## 2017-01-30 PROCEDURE — 74011250636 HC RX REV CODE- 250/636: Performed by: HOSPITALIST

## 2017-01-30 PROCEDURE — 80053 COMPREHEN METABOLIC PANEL: CPT | Performed by: INTERNAL MEDICINE

## 2017-01-30 PROCEDURE — 74011250637 HC RX REV CODE- 250/637: Performed by: HOSPITALIST

## 2017-01-30 PROCEDURE — 65270000029 HC RM PRIVATE

## 2017-01-30 PROCEDURE — 74011250637 HC RX REV CODE- 250/637: Performed by: NURSE PRACTITIONER

## 2017-01-30 PROCEDURE — 74011250636 HC RX REV CODE- 250/636: Performed by: INTERNAL MEDICINE

## 2017-01-30 PROCEDURE — 77010033678 HC OXYGEN DAILY

## 2017-01-30 PROCEDURE — 85025 COMPLETE CBC W/AUTO DIFF WBC: CPT | Performed by: INTERNAL MEDICINE

## 2017-01-30 PROCEDURE — 76450000000

## 2017-01-30 PROCEDURE — 74011250637 HC RX REV CODE- 250/637: Performed by: INTERNAL MEDICINE

## 2017-01-30 PROCEDURE — 84100 ASSAY OF PHOSPHORUS: CPT | Performed by: INTERNAL MEDICINE

## 2017-01-30 PROCEDURE — 82962 GLUCOSE BLOOD TEST: CPT

## 2017-01-30 PROCEDURE — 92526 ORAL FUNCTION THERAPY: CPT

## 2017-01-30 RX ORDER — ACETAMINOPHEN 325 MG/1
650 TABLET ORAL
Status: CANCELLED | OUTPATIENT
Start: 2017-01-30

## 2017-01-30 RX ORDER — SODIUM CHLORIDE 9 MG/ML
75 INJECTION, SOLUTION INTRAVENOUS CONTINUOUS
Status: DISCONTINUED | OUTPATIENT
Start: 2017-01-30 | End: 2017-01-31

## 2017-01-30 RX ORDER — CLINDAMYCIN HYDROCHLORIDE 150 MG/1
300 CAPSULE ORAL EVERY 6 HOURS
Status: DISCONTINUED | OUTPATIENT
Start: 2017-01-30 | End: 2017-01-31

## 2017-01-30 RX ORDER — PROMETHAZINE HYDROCHLORIDE 25 MG/1
25 SUPPOSITORY RECTAL
Status: DISCONTINUED | OUTPATIENT
Start: 2017-01-30 | End: 2017-02-02 | Stop reason: HOSPADM

## 2017-01-30 RX ORDER — HALOPERIDOL 2 MG/ML
2 SOLUTION ORAL
Status: DISCONTINUED | OUTPATIENT
Start: 2017-01-30 | End: 2017-02-02 | Stop reason: HOSPADM

## 2017-01-30 RX ORDER — OXYCODONE HCL 20 MG/ML
5 CONCENTRATE, ORAL ORAL
Status: DISCONTINUED | OUTPATIENT
Start: 2017-01-30 | End: 2017-02-02 | Stop reason: HOSPADM

## 2017-01-30 RX ADMIN — MINERAL OIL 1 DROP: 2; 2 EMULSION OPHTHALMIC at 22:12

## 2017-01-30 RX ADMIN — SODIUM CHLORIDE 1 DROP: 50 SOLUTION OPHTHALMIC at 10:14

## 2017-01-30 RX ADMIN — CLINDAMYCIN HYDROCHLORIDE 300 MG: 150 CAPSULE ORAL at 17:11

## 2017-01-30 RX ADMIN — DIFLUPREDNATE 1 DROP: 0.5 EMULSION OPHTHALMIC at 22:00

## 2017-01-30 RX ADMIN — Medication 10 ML: at 15:49

## 2017-01-30 RX ADMIN — SODIUM CHLORIDE 1 DROP: 50 SOLUTION OPHTHALMIC at 22:31

## 2017-01-30 RX ADMIN — OXYCODONE HYDROCHLORIDE 5 MG: 100 SOLUTION ORAL at 22:13

## 2017-01-30 RX ADMIN — PILOCARPINE HYDROCHLORIDE 1 DROP: 10 SOLUTION/ DROPS OPHTHALMIC at 03:54

## 2017-01-30 RX ADMIN — BRINZOLAMIDE 1 DROP: 10 SUSPENSION/ DROPS OPHTHALMIC at 15:41

## 2017-01-30 RX ADMIN — SODIUM CHLORIDE 75 ML/HR: 900 INJECTION, SOLUTION INTRAVENOUS at 19:37

## 2017-01-30 RX ADMIN — ESCITALOPRAM OXALATE 10 MG: 10 TABLET ORAL at 09:50

## 2017-01-30 RX ADMIN — CARVEDILOL 12.5 MG: 12.5 TABLET, FILM COATED ORAL at 09:50

## 2017-01-30 RX ADMIN — HEPARIN SODIUM 5000 UNITS: 5000 INJECTION, SOLUTION INTRAVENOUS; SUBCUTANEOUS at 06:33

## 2017-01-30 RX ADMIN — BRINZOLAMIDE 1 DROP: 10 SUSPENSION/ DROPS OPHTHALMIC at 10:00

## 2017-01-30 RX ADMIN — PILOCARPINE HYDROCHLORIDE 1 DROP: 10 SOLUTION/ DROPS OPHTHALMIC at 10:24

## 2017-01-30 RX ADMIN — BRIMONIDINE TARTRATE 1 DROP: 1 SOLUTION/ DROPS OPHTHALMIC at 15:46

## 2017-01-30 RX ADMIN — PILOCARPINE HYDROCHLORIDE 1 DROP: 10 SOLUTION/ DROPS OPHTHALMIC at 22:24

## 2017-01-30 RX ADMIN — BIMATOPROST 1 DROP: 0.1 SOLUTION/ DROPS OPHTHALMIC at 22:37

## 2017-01-30 RX ADMIN — LOTEPREDNOL ETABONATE 1 DROP: 5 SUSPENSION/ DROPS OPHTHALMIC at 10:06

## 2017-01-30 RX ADMIN — PIPERACILLIN SODIUM,TAZOBACTAM SODIUM 2.25 G: 2; .25 INJECTION, POWDER, FOR SOLUTION INTRAVENOUS at 11:54

## 2017-01-30 RX ADMIN — PIPERACILLIN SODIUM,TAZOBACTAM SODIUM 2.25 G: 2; .25 INJECTION, POWDER, FOR SOLUTION INTRAVENOUS at 00:07

## 2017-01-30 RX ADMIN — MINERAL OIL 1 DROP: 2; 2 EMULSION OPHTHALMIC at 13:00

## 2017-01-30 RX ADMIN — PREDNISONE 10 MG: 10 TABLET ORAL at 17:11

## 2017-01-30 RX ADMIN — ACETAMINOPHEN 650 MG: 325 TABLET, FILM COATED ORAL at 11:54

## 2017-01-30 RX ADMIN — SODIUM CHLORIDE 1000 MG: 900 INJECTION, SOLUTION INTRAVENOUS at 00:47

## 2017-01-30 RX ADMIN — PILOCARPINE HYDROCHLORIDE 1 DROP: 10 SOLUTION/ DROPS OPHTHALMIC at 15:50

## 2017-01-30 RX ADMIN — CLINDAMYCIN HYDROCHLORIDE 300 MG: 150 CAPSULE ORAL at 23:57

## 2017-01-30 RX ADMIN — BRINZOLAMIDE 1 DROP: 10 SUSPENSION/ DROPS OPHTHALMIC at 22:26

## 2017-01-30 RX ADMIN — DUREZOL 1 DROP: 0.5 EMULSION OPHTHALMIC at 22:45

## 2017-01-30 RX ADMIN — CARVEDILOL 12.5 MG: 12.5 TABLET, FILM COATED ORAL at 17:11

## 2017-01-30 RX ADMIN — PIPERACILLIN SODIUM,TAZOBACTAM SODIUM 2.25 G: 2; .25 INJECTION, POWDER, FOR SOLUTION INTRAVENOUS at 06:33

## 2017-01-30 RX ADMIN — BRIMONIDINE TARTRATE 1 DROP: 1 SOLUTION/ DROPS OPHTHALMIC at 22:43

## 2017-01-30 RX ADMIN — LEVOFLOXACIN 750 MG: 500 TABLET, FILM COATED ORAL at 15:38

## 2017-01-30 RX ADMIN — OXYCODONE HYDROCHLORIDE 5 MG: 100 SOLUTION ORAL at 17:11

## 2017-01-30 RX ADMIN — MINERAL OIL 1 DROP: 2; 2 EMULSION OPHTHALMIC at 10:11

## 2017-01-30 RX ADMIN — ASPIRIN 81 MG: 81 TABLET, COATED ORAL at 09:50

## 2017-01-30 RX ADMIN — BRIMONIDINE TARTRATE 1 DROP: 1 SOLUTION/ DROPS OPHTHALMIC at 09:55

## 2017-01-30 RX ADMIN — ATORVASTATIN CALCIUM 10 MG: 10 TABLET, FILM COATED ORAL at 09:50

## 2017-01-30 RX ADMIN — PREDNISONE 10 MG: 10 TABLET ORAL at 09:50

## 2017-01-30 RX ADMIN — HALOPERIDOL LACTATE 2 MG: 2 SOLUTION, CONCENTRATE ORAL at 15:38

## 2017-01-30 RX ADMIN — DIFLUPREDNATE 1 DROP: 0.5 EMULSION OPHTHALMIC at 10:25

## 2017-01-30 RX ADMIN — OLOPATADINE HYDROCHLORIDE 1 DROP: 2 SOLUTION/ DROPS OPHTHALMIC at 10:20

## 2017-01-30 NOTE — CONSULTS
[address] [place] [address] Rd    Name:  Marisabel Wolf  MR#:  [id number]  :  [id number]  Account #:  [de-identified]  Date of Adm:  2017  Date of Consultation:  2017      REASON FOR CONSULTATION: A 22-year-old female referred by Dr. Irlanda Daniel for further evaluation and management of MRSA bacteremia. RECOMMENDATIONS  1. Methicillin-resistant Staphylococcus aureus bacteremia, occult  source. Blood culture on arrival  sets. Followup blood cultures are  sterile. The patient has a pacemaker in place that appears benign. There is no cutaneous stigmata of endocarditis. There is no indwelling  central venous catheter. Whether MRSA represents a significant  pathogen in this setting is unclear. Will continue vancomycin at  present. Though possibly an acquisition contaminant, continued  antimicrobial therapy seems appropriate directed at this pathogen. 2. Urinary tract infection, active urine sediment. Escherichia coli  isolated. No significant complaints of costovertebral angle tenderness. Unlikely to represent pyelonephritis. As such, will continue antibiotics  directed at Escherichia coli. Would hope to limit course. 3. Pneumonia. This appears to be the primary process. Right basilar  infiltrate. Continued complaints of cough and shortness of breath. The  patient appears uncomfortable. She is DO NOT RESUSCITATE. She  desires be in her own bed. She has been refusing BiPAP. Whether this  represents manifestation of an aspiration event, it is unclear though  suspected. Would continue empiric Zosyn and azithromycin. I spoke with this patient's daughter at length regarding the extent of  care. The patient's daughter states that indeed her mother does not  want any unnecessary life prolonging interventions.  I suggested  perhaps changing goals directed at primarily comfort and secondary  medical concerns may be in order, as her mother appears to be  distinctly uncomfortable and does not want to be here. The patient's  daughter agreed that this may be most appropriate and would like to  speak with palliative care service further tomorrow regarding these  issues. I suggested no isolation will be needed as nursing home is her  home for the past 6 years. I also suggested we could continue to treat  reasonably with oral levofloxacin and clindamycin on discharge. Thank you for allowing me to participate in the care of your the patient. Will continue to follow with you. HISTORY OF PRESENT ILLNESS: The patient is a 70-year-old  female with longstanding history of chronic obstructive pulmonary  disease, diabetes, and atrial fibrillation. The patient is a nursing home  resident. She presented to [facility] 01/25/2017 with  increasing cough, shortness of breath, though was not responding to  outpatient antimicrobial therapy. The patient was admitted to [facility] and blood cultures drawn which have since  demonstrated MRSA. Followup blood cultures are sterile. The patient  has been treated with broad-spectrum antimicrobial therapy and states  she feels no better. Presently, the patient denies complaints of headache, diplopia, facial  pain, mouth pain, neck pain. There is continued cough, though little  sputum. The patient relates she is uncomfortable with BiPAP. She  denies complaints of chest pain, abdominal pain, nausea, vomiting,  diarrhea. No complaints of dysuria, urgency, frequency. No additional  myalgias, arthralgias, or skin rash. There is no reported chronic fever,  chills or sweats. A full 12-point review of systems was performed,  unless otherwise systemically stated above, was negative. PAST MEDICAL HISTORY: Chronic obstructive pulmonary disease,  atrial fibrillation, diabetes. No known history of heart disease, or  hypertension. ALLERGIES: CODEINE CAUSING RASH.     SOCIAL HISTORY: The patient is from [facility],  accompanied by her daughter. Denies ongoing tobacco or alcohol  abuse. Denies unusual environmental exposures. FAMILY HISTORY: No history of recurrent skin or soft tissue  infections. REVIEW OF SYSTEMS  As above. PHYSICAL EXAMINATION  GENERAL: Awake, alert, oriented x2. VITAL SIGNS: Temperature 98.1, blood pressure 138/62, heart rate  60. HEENT: Ecchymoses. No conjunctival lesions. Oral cavity clear. No  thrush or ulcers noted. NECK: Supple. No JVD. CHEST: Scattered bibasilar crackles. No expiratory wheezes, right  greater than left. CARDIOVASCULAR: Grade 2/6 murmur at the apex and irregular. The  permanent pacemaker site appears benign, without surrounding  erythema, fluctuance or discharge. ABDOMEN: Soft, nontender. No rebound or guarding. No  hepatosplenomegaly. No masses. BACK: No apparent CVA tenderness. GENITOURINARY: No inguinal adenopathy. No lesions. EXTREMITIES: Distal edema. No rash or petechiae. No splinter  hemorrhages, Janeway lesions, Osler nodes. No phlebitis. LABORATORY: White blood cell 8.5, hemoglobin 10.2, platelet count  155,451. Urinalysis with 10-20 white cells. Sodium 145, potassium 4.7,  chloride 113, bicarbonate 22, BUN 48, creatinine 1.1, ALT 67, AST 56. Chest x-ray independently reviewed. >60 minutes spent w/over 50% time d/w patient/ daughter goal of care and  Consideration comfort measures alone.     Sherryle Mcgill, MD DK / PORFIRIO  D:  01/29/2017   16:17  T:  01/29/2017   20:26  Job #:  [id number]

## 2017-01-30 NOTE — PROGRESS NOTES
Shift summary: Patient slept through most of shift. Denied pain. Yelled out \"I want to go home\" several times throughout the night. Bilateral lungs diminished. Eye drops in patient specific bin. Incontinent, voided. Offered repositioning, patient declined and requested to be supine. Found patient with blood around lips, patient reported dry mouth. Mouth care provided. Spoke to RT and made them aware, patient to be switched over to nasal cannula with humidification.      Patient Vitals for the past 12 hrs:   Temp Pulse Resp BP SpO2   01/30/17 0756 97.6 °F (36.4 °C) (!) 104 20 160/89 98 %   01/30/17 0001 98.1 °F (36.7 °C) 86 20 143/88 100 %

## 2017-01-30 NOTE — PROGRESS NOTES
Problem: Dysphagia (Adult)  Goal: *Acute Goals and Plan of Care (Insert Text)  Dysphagia Present: moderate  Aspiration: at risk     Recommendations:  Diet: mech soft ground, nectar-thick liquids; ice chips PRN for comfort  Meds: crushed in puree  Aspiration Precautions  Other: 1:1 feeding assistance, safe swallow strategies    Goals: Patient will:  1. Tolerate PO trials with 0 s/s overt distress in 4/5 trials  2. Utilize compensatory swallow strategies/maneuvers (decrease bite/sip, size/rate, alt. liq/sol) with min cues in 4/5 trials  3. Perform oral-motor/laryngeal exercises to increase oropharyngeal swallow function with min cues  4. Complete an objective swallow study (i.e., MBSS) to assess swallow integrity, r/o aspiration, and determine of safest LRD, min A  Outcome: Progressing Towards Goal  SPEECH LANGUAGE PATHOLOGY DYSPHAGIA TREATMENT     Patient: Steve Johnson (89 y.o. female)  Date: 1/30/2017  Diagnosis: Respiratory distress  Pneumonia  Sepsis ([facility] [address]  UTI (urinary tract infection)  COPD exacerbation (HCC) Acute respiratory failure (HCC)       Precautions: aspiration Fall, DNR      ASSESSMENT:  Moderate oropharyngeal dysphagia     Dysphagia f/u completed this AM with pt A&Ox2. Diet tolerance f/u for puree solids and nectar-thick liquids via controlled small sips tolerated without overt s/s penetration/aspiration. Strong cough x1 soft cracker suspect 2/2 dry nature, with labored mastication; remains most appropriate for ground consistency. Nectar-thick via successive sips and thin liquids +straw also c/w strong cough in 50% trials. Pt reporting preference to thin liquids though also reporting awareness of dysphagia s/s. Met with pt's daughter during palliative meeting to discuss dysphagia s/s. Educated re: dysphagia, diet recommendations, aspiration risk, and comfort feeding considerations (as pt requesting thin liquids).  Daughter reports pt demo overt difficulty with thin liquids PTA in setting of COPD, requesting to continue nectar-thick liquids during meals and allow ice chips between meals. Note palliative care decision post session with plan to transfer to comfort care. Recommend continue mech soft ground and nectar-thick liquid at daughter request, ice chips between meals. Will f/u 1-2 visits for ongoing education with family re: dysphagia, comfort feeding, and aspiration precautions. Progression toward goals:  [X]         Improving appropriately and progressing toward goals  [ ]         Improving slowly and progressing toward goals  [ ]         Not making progress toward goals and plan of care will be adjusted       PLAN: mech soft ground, nectar-thick liquids, ice chips between meals  Recommendations and Planned Interventions:  ST to f/u for ongoing education re: dysphagia, aspiration risk, comfort feeding  Patient continues to benefit from skilled intervention to address the above impairments. Continue treatment per established plan of care. Discharge Recommendations:  Comfort care       SUBJECTIVE:   Patient stated I want some water. OBJECTIVE:   Cognitive and Communication Status:  Neurologic State: Alert  Orientation Level: Oriented to person, Oriented to place  Cognition: Follows commands, Decreased attention/concentration  Perception: Appears intact  Perseveration: No perseveration noted  Safety/Judgement: Decreased insight into deficits  Dysphagia Treatment:  Oral Assessment:  Oral Assessment  Labial: Decreased rate  Dentition: Upper & lower dentures  Oral Hygiene:  (Good)  Lingual: Decreased rate  Velum: No impairment  Mandible: No impairment  Gag Reflex:  (Not elicited)  P.O. Trials:              Patient Position: HOB 40              Vocal quality prior to P.O.: Low volume              Consistency Presented:  Thin liquid, Nectar thick liquid, Mechanical soft, Puree              How Presented: SLP-fed/presented, Straw, Spoon              How Much:  (x2oz thin, x2oz NTL, x2oz puree, x2 mech soft)              Bolus Acceptance: No impairment              Bolus Formation/Control: Impaired              Type of Impairment: Lip closure, Delayed, Incomplete              Propulsion: Delayed (# of seconds)              Oral Residue: 10-50% of bolus, Lingual (mech soft)              Initiation of Swallow: Delayed (# of seconds)              Laryngeal Elevation: Decreased              Aspiration Signs/Symptoms: Strong cough, Infiltrate on chest xray              Pharyngeal Phase Characteristics: Poor endurance              Effective Modifications: Small sips and bites, Alternate liquids/solids              Cues for Modifications:  (1:1 feeding assistance)                                Oral Phase Severity: Mild-moderate              Pharyngeal Phase Severity : Moderate                                                                                                                                                                                                                                                                                                                                                                                                                                                                                                                                                                                                                                                                                                                                                                                                                                                                                                                    PAIN:  Start of Tx: 0  End of Tx: 0      After treatment:   [ ]              Patient left in no apparent distress sitting up in chair  [X]              Patient left in no apparent distress in bed  [X]              Call bell left within reach  [ ]              Nursing notified  [ ]              Family present  [ ]              Caregiver present  [ ]              Bed alarm activated         COMMUNICATION/EDUCATION:   [X]        Aspiration precautions; swallow safety; compensatory techniques  [ ]        Patient unable to participate in education; education ongoing with staff  [ ]         Posted safety precautions in patient's room.   [ ]         Oral-motor/laryngeal strengthening exercises        PEGGY Posey  Time Calculation: 24 mins

## 2017-01-30 NOTE — CONSULTS
[facility]: [phone] ([phone]  [place] [facility]: [phone]   [facility] [place]: [phone]    Patient Name: Shireen Fuentes  YOB: 1921    Date of Initial Consult: 1/30/17   Reason for Consult: Care Decisions  Requesting Provider: Pilo Winters MD   Primary Care Physician: Fabiana Robins MD      SUMMARY:   Shireen Fuentes is a 80y.o. year old with a past history of DM, HTN, COPD, Afib, who was admitted on 1/25/2017 from [facility]  with a diagnosis of:    COPD with exacerbation  Sepsis  Acute respiratory failure  MRSA bacteremia  HCAP pneumonia  Chronic a fib  Long QT interval  DM  HTN  A-fib  Third degree block  UTI        Current medical issues leading to Palliative Medicine involvement include: Care Decisions in setting of advanced age with little improvement with treatment and patient's desire to return home. Presented with increasing cough, shortness of breath, and was not responding to outpatient antimicrobial therapy given at [facility]. She is chronically on oxygen, but she was saturating in the 80% PTA. Blood cultures here demonstrated MRSA. Followup blood cultures are sterile. The patient has been treated with broad-spectrum antimicrobial therapy and states and she feels no better. At baseline, can stand and transfer to a chair, but she is otherwise not ambulatory. Has been at [facility] for about 6 years. Dr. Raul Holder discussed a change in [address] with daughter as there has been little improvement noted with patient repeatedly stating she does not wish to be here and wants to go home. Daughter states her mother in the past has always told her that she \"doesn't want to die\" and always seems to bounce back. She was \"near death\" about 2 years ago and recovered. She has had no hospitalizations in the past 1-2 years. She last walked about 2 years ago.  Daughter aware that benefits of treatment will begin to decline and believes this is what may be happening now. Daughter wishes for her mother to make decision about [address]. PALLIATIVE DIAGNOSES:   1. Acute respiratory failure  2. Pneumonia  3. Dyspnea  4. Debility   5. Comfort Care       PLAN:   1. Prior to discussing with patient, we spoke with daughter Parker Sewell and son in law and clarified the difference between aggressive care and comfort plan and what each would mean. Daughter also voiced concerns that she believes her mother has a fear of dying. She indicated that her mother worries about many things and can become agitated. Daughter felt her mother was able to make her own decision. Palliative Medicine team Melecio Nageotte MD, [facility] NP)  met with patient at bedside. She is alert and has a relatively good understanding of her current medical condition. She was asked by us and separately by her daughter her preference for continuation of aggressive care with continued hospitalization and IV antibiotics versus a choice to return to her home at [facility] with oral antibiotics with reduced chance that she will improve. She agreed with plan to return to [facility] with comfort as goal and to receive oral antibiotics with the understanding this is less aggressive treatment and may result in a more rapid decline. She indicated that she knows at some point she will die at her age of 80, almost 80 and accepts this. Her hope is to be more comfortable and she stated \"I can't stay here\" as it was too uncomfortable for her. Plan is to begin comfort care here and continue eye drops and oral antibiotics and return to [facility] with hospice Joselyn Linda?). Comfort Care orders placed. Will defer to Attending, Dr. Niesha Mccracken, to change to oral antibiotics as discussed. Psychosocial/Functional status:  in . Worked as a   3 children--one . Has always relied on daughter Parker Sewell more that other daughter who lives in [place] and moved here 20 years ago to be closer  Resides at [facility] for past 6 years.   Active socially at [facility].      2. Advance Care Planning: Has AD per daughter who states both she and her spouse are listed as MPOAs. She will provide a copy. Code Status: DNR    Durable DNR status: Has existing DDNR. 4. Symptoms: none    5. Disposition: TBD but will likely return to [facility]. 6. Initial consult note routed to primary continuity provider. 7. Communicated plan of care with: Palliative IDT, patient, family, nurse, Dr. Augustine Case. GOALS OF CARE:     [====Goals of Care====]  GOALS OF CARE:  Patient / health care proxy stated goals: Begin comfort care here and continue eye drops and oral antibiotics and return to [facility] with hospice Rylee Xavier?).       TREATMENT PREFERENCES:   Code Status: DNR    Advance Care Planning:  Advance Care Planning 1/25/2017   Patient's Healthcare Decision Maker is: Legal Next of Kin   Primary Decision Maker Name [facility]   Primary Decision Maker Phone Number [phone]   Primary Decision Maker Relationship to Patient Adult child   Secondary Decision Maker Name Flonnie High    Secondary Decision Maker Relationship to Patient Adult child   Confirm Advance Directive Yes, on file   Does the patient have other document types -       Other:    The palliative care team has discussed with patient / health care proxy about goals of care / treatment preferences for patient.  [====Goals of Care====]      Advance Care Planning 1/25/2017   Patient's Healthcare Decision Maker is: Legal Next of Kin   Primary Decision Maker Name [facility]   Primary Decision Maker Phone Number [phone]   Primary Decision Maker Relationship to Patient Adult child   Secondary Decision Maker Name Flonnie High    Secondary Decision Maker Relationship to Patient Adult child   Confirm Advance Directive Yes, on file   Does the patient have other document types -      [facility] [phone]     HISTORY:     History obtained from: chart, patient, daughter    CHIEF COMPLAINT: SOB    HPI/SUBJECTIVE:    The patient is:   [x] Verbal and participatory  [] Non-participatory due to:      SEE SUMMARY    Clinical Pain Assessment (nonverbal scale for nonverbal patients): Pain: 0         FUNCTIONAL ASSESSMENT:     Palliative Performance Scale (PPS):  PPS: 40       PSYCHOSOCIAL/SPIRITUAL SCREENING:     Advance Care Planning:  Advance Care Planning 1/25/2017   Patient's Healthcare Decision Maker is: Legal Next of Kin   Primary Decision Maker Name Davide   Primary Decision Maker Phone Number [phone]   Primary Decision Maker Relationship to Patient Adult child   Secondary Decision Maker Name [facility]    Secondary Decision Maker Relationship to Patient Adult child   Confirm Advance Directive Yes, on file   Does the patient have other document types -        Any spiritual / Mosque concerns:  [x] Yes /  [] No  Daughter identified a 'fear of dying'   Will contact  for assistance. Caregiver Burnout:  [] Yes /  [x] No /  [] No Caregiver Present      Anticipatory grief assessment:   [x] Normal  / [] Maladaptive       ESAS Anxiety: Anxiety: 4     ESAS Depression:   not addressed at this visit       REVIEW OF SYSTEMS:     Positive and pertinent negative findings in ROS are noted above in HPI. The following systems were [x] reviewed / [] unable to be reviewed as noted in HPI   Other findings are noted below. Systems: constitutional, ears/nose/mouth/throat, respiratory, gastrointestinal, genitourinary, musculoskeletal, integumentary, neurologic, psychiatric, endocrine. Positive findings noted below. Modified ESAS Completed by: provider           Pain: 0   Anxiety: 4     Anorexia: 2 Dyspnea: 0           Stool Occurrence(s): 0        PHYSICAL EXAM:     Wt Readings from Last 3 Encounters:   01/27/17 71.4 kg (157 lb 6.4 oz)   08/26/15 81.1 kg (178 lb 12.8 oz)   05/14/15 73.9 kg (163 lb)     Blood pressure 160/89, pulse (!) 104, temperature 97.6 °F (36.4 °C), resp.  rate 20, height 5' 6\" (1.676 m), weight 71.4 kg (157 lb 6.4 oz), SpO2 98 %. Pain:  Pain Scale 1: FLACC  Pain Intensity 1: 0     Pain Location 1: Leg  Pain Orientation 1: Right  Pain Description 1: Sore  Pain Intervention(s) 1: Medication (see MAR)  Last bowel movement:     Constitutional: NAD. Alert and pleasant. Eyes: pupils equal, anicteric  ENMT: no nasal discharge, moist mucous membranes  Cardiovascular: regular rhythm, no pedal edema  Respiratory: breathing not labored, symmetric  Gastrointestinal: soft non-tender, +bowel sounds  Musculoskeletal: no deformity, no tenderness to palpation  Skin: warm, dry  Neurologic: following commands, moving all extremities  Psychiatric: full affect, no hallucinations  Other:       HISTORY:     Principal Problem:    Acute respiratory failure ([address] (1/25/2017)    Active Problems:    Hypertension ()      DM (diabetes mellitus) ([address] (5/21/2012)      A-fib ([address] (6/24/2012)      Third degree heart block ([address] (5/9/2015)      COPD with acute exacerbation ([address] (8/21/2015)      UTI (urinary tract infection) (1/25/2017)      Sepsis ([address] (1/25/2017)      MRSA bacteremia (1/27/2017)      HCAP (healthcare-associated pneumonia) (1/27/2017)      Long QT interval (1/27/2017)      Past Medical History   Diagnosis Date    Adhesive capsulitis     Anemia     Anxiety     Arthritis     Asthma     Atrial fibrillation ([address]     CAD (coronary artery disease)     COPD     Diabetes ([address]     Diverticulosis     GERD (gastroesophageal reflux disease)     Hypertension     Osteoporosis     Peripheral neuropathy ([address]     Psychiatric disorder      depression    Thromboembolus ([address]     Unspecified sleep apnea      no longer wears CPAP.       Past Surgical History   Procedure Laterality Date    Hx colectomy      Hx appendectomy      Hx gyn       hysterectomy    Hx heent       tonsillectomy    Hx hemorrhoidectomy      Hx corneal transplant        Family History   Problem Relation Age of Onset    Heart Disease Mother     Cancer Father     Cancer Sister      History reviewed, no pertinent family history.   Social History   Substance Use Topics    Smoking status: Never Smoker    Smokeless tobacco: Never Used    Alcohol use No     Allergies   Allergen Reactions    Latex Rash    Codeine Other (comments)     Pt unable to answer reaction      Current Facility-Administered Medications   Medication Dose Route Frequency    haloperidol (HALDOL) 2 mg/mL oral solution 2 mg  2 mg Oral Q6H PRN    promethazine (PHENERGAN) suppository 25 mg  25 mg Rectal Q6H PRN    predniSONE (DELTASONE) tablet 10 mg  10 mg Oral BID WITH MEALS    loteprednol etabonate (LOTEMAX) 0.5 % ophthalmic suspension 1 Drop  1 Drop Right Eye DAILY    sodium chloride (NS) flush 5-10 mL  5-10 mL IntraVENous Q8H    sodium chloride (NS) flush 5-10 mL  5-10 mL IntraVENous PRN    albuterol-ipratropium (DUO-NEB) 2.5 MG-0.5 MG/3 ML  3 mL Nebulization Q4H PRN    aspirin delayed-release tablet 81 mg  81 mg Oral DAILY    azithromycin (AZASITE) 1 % ophthalmic solution 1 Drop  1 Drop Left Eye QHS    bimatoprost (LUMIGAN) 0.01 % ophthalmic drops 1 Drop  1 Drop Right Eye QHS    brimonidine (ALPHAGAN P) 0.1 % ophthalmic solution 1 Drop  1 Drop Both Eyes TID    brinzolamide (AZOPT) 1 % ophthalmic suspension 1 Drop  1 Drop Both Eyes TID    carvedilol (COREG) tablet 12.5 mg  12.5 mg Oral BID WITH MEALS    [START ON 1/31/2017] Difluprednate (DUREZOL) 0.05 % ophthalmic emulsion 1 Drop  1 Drop Right Eye Q7D    Difluprednate (DUREZOL) 0.05 % ophthalmic emulsion 1 Drop  1 Drop Left Eye TID    escitalopram oxalate (LEXAPRO) tablet 10 mg  10 mg Oral DAILY    pilocarpine (PILOCAR) 1 % ophthalmic solution 1 Drop  1 Drop Right Eye Q6H    olopatadine (PATADAY) ophthalmic solution 0.2 % -  1 Drop  1 Drop Both Eyes DAILY    sodium chloride (MAHESH-5) 5 % ophthalmic solution 1 Drop  1 Drop Left Eye BID    acetaminophen (TYLENOL) tablet 650 mg  650 mg Oral Q6H PRN    light mineral oil-min oil (PF) 0.5-0.5 % dpet 1 Drop  1 Drop Ophthalmic QID        LAB AND IMAGING FINDINGS:     Lab Results   Component Value Date/Time    WBC 9.4 01/30/2017 06:16 AM    HGB 10.2 01/30/2017 06:16 AM    PLATELET 443 14/48/6771 06:16 AM     Lab Results   Component Value Date/Time    Sodium 149 01/30/2017 06:16 AM    Potassium 3.3 01/30/2017 06:16 AM    Chloride 113 01/30/2017 06:16 AM    CO2 26 01/30/2017 06:16 AM    BUN 40 01/30/2017 06:16 AM    Creatinine 1.08 01/30/2017 06:16 AM    Calcium 8.6 01/30/2017 06:16 AM    Magnesium 1.9 01/30/2017 06:16 AM    Phosphorus 2.9 01/30/2017 06:16 AM      Lab Results   Component Value Date/Time    AST 16 01/30/2017 06:16 AM    Alk. phosphatase 38 01/30/2017 06:16 AM    Protein, total 5.2 01/30/2017 06:16 AM    Albumin 2.3 01/30/2017 06:16 AM    Globulin 2.9 01/30/2017 06:16 AM     Lab Results   Component Value Date/Time    INR 1.3 01/25/2017 09:40 AM    Prothrombin time 15.9 01/25/2017 09:40 AM    aPTT 20.3 01/25/2017 09:40 AM      No results found for: IRON, FE, TIBC, IBCT, PSAT, FERR   No results found for: PH, PCO2, PO2  No components found for: [place]   Lab Results   Component Value Date/Time    CK 40 01/25/2017 09:40 AM    CK - MB <0.5 01/25/2017 09:40 AM              Total time: 70  Counseling / coordination time: 60  > 50% counseling / coordination?:     Prolonged service was provided for  []30 min   []75 min in face to face time in the presence of the patient. Time Start:   Time End:   Note: this can only be billed with 82831 (initial) or 05795 (follow up). If multiple start / stop times, list each separately.     Jason Mercado Luis Enrique 87, FNP-BC, [facility] Palliative Medicine Nurse Practitioner

## 2017-01-30 NOTE — ROUTINE PROCESS
Bedside and Verbal shift change report given to ERROL Barry (oncoming nurse) by Carlene Garcia RN   (offgoing nurse). Report included the following information SBAR, Kardex and MAR.

## 2017-01-30 NOTE — ROUTINE PROCESS
Bedside shift change report given to KAYLEE Chow (oncoming nurse) by Ambrocio Onofre RN (offgoing nurse). Report included the following information SBAR, Kardex, Intake/Output, MAR and Recent Results.

## 2017-01-30 NOTE — PROGRESS NOTES
Vancomycin trough = 12.9 @ 2218 on 01/29/2017 increase dosing  Vancomycin 1000 mg IV q 24 hr starting 0000 01/30/2017 with pharmacy continuing to monitor and adjusting according to clinical status

## 2017-01-30 NOTE — PALLIATIVE CARE
Palliative Note:    Clarified with daughter Marcio Parish that plan IS for comfort bed with hospice and NOT skilled bed. Dr. Dianne Hayden and Sherice Yan aware. Added Roxicodone for pain/other symptoms. Aware of allergy to codeine--- rash. Discussed with pharmacy and Dr. Cata De. Pharmacy suggests oxycodone over morphine as less likelihood of reaction. Discussed with patient and daughter low but possible risk of rash and may see with Roxicodone. Both agree with plan.     Vera Mercado Luis Enrique 87, FNP-BC, [facility] Palliative Medicine Nurse Practitioner

## 2017-01-30 NOTE — PROGRESS NOTES
Received request from [facility], palliative care nurse to assist pt's daughter with hospice consult. CM met with daughter, Vijaya Perez, [phone],, and provided [facility] list of hospice agencies and daughter [facility]; however when CM contacted Vero Rehman at [facility], she stated they do not contract with [facility]. Jazmine also stated they can bring pt back skilled for speech. CM met with daughter to inform her of this, and she stated she would prefer to bring pt back to [facility] skilled and consider hospice referral in the near future. CM informed Dr. Dianne Hayden and [facility] of the above, and they plan to talk with daughter again to clarify goals before any discharge plan is made. Received call from [address] clarifying that daughter does want hospice care for pt. CM received information from [place] [phone] that they contract with  [facility] and [facility]. CM called daughter and provided her with this information and she chose [facility], [phone]. CM notified CMS who is faxing referral to [facility]. CM called and spoke with Graciela Remy of [facility] to request she call pt's daughter. Reviewed the above plan with Dr. Dianne Hayden, who stated pt is not being discharged today, but he anticipates possible discharge for tomorrow. Notified Vero Rehman at [facility] of the above. Care Management Interventions  PCP Verified by CM: Yes  Mode of Transport at Discharge:  Other (see comment)  Transition of Care Consult (CM Consult): Discharge Planning, Home Hospice (at [address])  [facility]: No  Reason Outside [place]: Unable to staff case  MyChart Signup: No  Discharge Durable Medical Equipment: No  Health Maintenance Reviewed: Yes  Physical Therapy Consult: Yes  Occupational Therapy Consult: No  Speech Therapy Consult: Yes  Current Support Network: Nursing Facility  Confirm Follow Up Transport: Other (see comment)  Plan discussed with Pt/Family/Caregiver: Yes  Freedom of Choice Offered: Yes ([facility])  Discharge Location  Discharge Placement: Skilled nursing facility [facility] [place] [facility])

## 2017-01-30 NOTE — PROGRESS NOTES
Hospitalist Progress Note    Patient: Mike Rowley MRN: [id number]  CSN: [id number]    YOB: 1921  Age: 80 y.o. Sex: female    DOA: 1/25/2017 LOS:  LOS: 5 days          Chief Complaint: pneumonia. Assessment/Plan     -Acute respiratory failure 2/2 pneumonia. -Sepsis 2/2 Pneumonia.  -E. coli bacteremia 1/26/17.    -MRSA bacteremia. Cx dated 1/25/17. (ca-mrsa?)  -Hypernatremia/hypokalemia  -Protein malnutrition moderate severity poa.   -Anemia normochromic normocytic.  -Glaucoma.  -Hx ppm placement for 3rd degree block. -Dyslipidemia.  -DM. I have discussed the case with Dr. Josefina White of Palliative Care. We are working to better define the goals of care in this 81 y/o patient. The chart would seem to suggest that comfort care might be a possibility. Will follow along. For the moment continue antibiotics and supportive care on the medical rosales. On multiple eyedrops. I have never seen so many for one patient. Will ask RN to verify prescriptions. DVT px sq heparin. Patient rounded on/discussed with RN and CM. Patient Active Problem List   Diagnosis Code    Hypertension I10    DM (diabetes mellitus) ([facility] E11.9    Chronic airway obstruction, not elsewhere classified ([facility] J44.9    A-fib ([facility] I48.91    Third degree heart block (HCC) I44.2    COPD with acute exacerbation (HCC) J44.1    UTI (urinary tract infection) N39.0    Pneumonia J18.9    Respiratory distress R06.00    COPD exacerbation (HCC) J44.1    Sepsis (HCC) A41.9    Acute respiratory failure (HCC) J96.00    Bacteremia R78.81    MRSA bacteremia R78.81    HCAP (healthcare-associated pneumonia) J18.9    Long QT interval R94.31       Subjective:    Seen and examined at bedside. Data and chart reviewed. Does not wish to be here in the inpatient environment.           Review of systems:    Constitutional: denies fevers, chills, myalgias  Respiratory: denies SOB, cough  Cardiovascular: denies chest pain, palpitations  Gastrointestinal: denies nausea, vomiting, diarrhea      Vital signs/Intake and Output:  Visit Vitals    /89    Pulse (!) 104    Temp 97.6 °F (36.4 °C)    Resp 20    Ht 5' 6\" (1.676 m)    Wt 71.4 kg (157 lb 6.4 oz)    SpO2 98%    BMI 25.41 kg/m2     Current Shift:     Last three shifts:  01/28 1901 - 01/30 0700  In: 1100 [P.O.:220; I.V.:880]  Out: -     Exam:    General: nad  Head/Neck: mmm, ecchymoses on face. CVS:s1s2 rrr  Lungs:Coarse b/l. A few faint wheezes. Good air movement. Abdomen: Soft,  bs+  Extremities: Trace edema. Labs: Results:       Chemistry Recent Labs      01/30/17   0616  01/29/17   0152  01/28/17   0307   GLU  120*  138*  138*   NA  149*  145  142   K  3.3*  4.7  3.9   CL  113*  113*  110*   CO2  26  22  20*   BUN  40*  48*  50*   CREA  1.08  1.10  1.38*   CA  8.6  8.5  8.6   AGAP  10  10  12   BUCR  37*  44*  36*   AP  38*  42*   --    TP  5.2*  5.2*   --    ALB  2.3*  2.0*   --    GLOB  2.9  3.2   --    AGRAT  0.8  0.6*   --       CBC w/Diff Recent Labs      01/30/17   0616  01/29/17   0152   WBC  9.4  8.5   RBC  3.46*  3.47*   HGB  10.2*  10.2*   HCT  32.8*  33.5*   PLT  236  215   GRANS  72  80*   LYMPH  18*  14*   EOS  0  0      Cardiac Enzymes No results for input(s): CPK, CKND1, LILIANA in the last 72 hours. No lab exists for component: CKRMB, TROIP   Coagulation No results for input(s): PTP, INR, APTT in the last 72 hours. No lab exists for component: INREXT    Lipid Panel Lab Results   Component Value Date/Time    Cholesterol, total 147 06/24/2012 02:15 AM    HDL Cholesterol 46 06/24/2012 02:15 AM    LDL, calculated 78.8 06/24/2012 02:15 AM    VLDL, calculated 22.2 06/24/2012 02:15 AM    Triglyceride 111 06/24/2012 02:15 AM    CHOL/HDL Ratio 3.2 06/24/2012 02:15 AM      BNP No results for input(s): BNPP in the last 72 hours.    Liver Enzymes Recent Labs      01/30/17   0616   TP  5.2*   ALB  2.3*   AP  38*   SGOT  16 Thyroid Studies Lab Results   Component Value Date/Time    TSH 0.16 01/29/2017 01:52 AM        Procedures/imaging: see electronic medical records for all procedures/Xrays and details which were not copied into this note but were reviewed prior to creation of Antoinette Bond MD

## 2017-01-30 NOTE — PALLIATIVE CARE
Palliative Note:    Patient and family have changed GOC to Comfort. Dr. Diaz Peers aware and agrees to change from IV to oral antibiotics. Nurse Gunjan aware also. Will change orders to reflect a comfort plan with note to follow.     Aline Mercado Luis Enrique 87, FNP-BC, [facility] Palliative Medicine Nurse Practitioner

## 2017-01-30 NOTE — PROGRESS NOTES
Pharmacist Renal Dosing Progress Note for Levofloxacin    The following medication: Levofloxacin was automatically dose-adjusted per [facility] P&T Committee Protocol, with respect to renal function. Consult provided for this   80 y.o. , female , for the indication of HCAP    Pt Weight:   Wt Readings from Last 1 Encounters:   01/27/17 71.4 kg (157 lb 6.4 oz)     Previous Regimen   Levofloxacin 750 mg PO every 24 hours   Serum Creatinine Lab Results   Component Value Date/Time    Creatinine 1.08 01/30/2017 06:16 AM       Creatinine Clearance Estimated Creatinine Clearance: 31.5 mL/min (based on Cr of 1.08). BUN Lab Results   Component Value Date/Time    BUN 40 01/30/2017 06:16 AM         Dosage changed to:  Levofloxacin 750 mg PO every 48 hours. Pharmacy to continue to monitor patient daily. Will make dosage adjustments based upon changing renal function.   Signed Lila Samson information:  [phone]

## 2017-01-31 PROBLEM — R06.03 RESPIRATORY DISTRESS: Status: RESOLVED | Noted: 2017-01-25 | Resolved: 2017-01-31

## 2017-01-31 PROBLEM — R53.81 DEBILITY: Status: RESOLVED | Noted: 2017-01-30 | Resolved: 2017-01-31

## 2017-01-31 PROBLEM — J18.9 PNEUMONIA: Status: RESOLVED | Noted: 2017-01-25 | Resolved: 2017-01-31

## 2017-01-31 PROBLEM — R06.00 DYSPNEA: Status: RESOLVED | Noted: 2017-01-30 | Resolved: 2017-01-31

## 2017-01-31 PROBLEM — J44.1 COPD EXACERBATION (HCC): Status: RESOLVED | Noted: 2017-01-25 | Resolved: 2017-01-31

## 2017-01-31 PROBLEM — R78.81 BACTEREMIA: Status: RESOLVED | Noted: 2017-01-26 | Resolved: 2017-01-31

## 2017-01-31 PROCEDURE — 77010033678 HC OXYGEN DAILY

## 2017-01-31 PROCEDURE — 74011250636 HC RX REV CODE- 250/636: Performed by: HOSPITALIST

## 2017-01-31 PROCEDURE — 74011250637 HC RX REV CODE- 250/637: Performed by: HOSPITALIST

## 2017-01-31 PROCEDURE — 74011250636 HC RX REV CODE- 250/636: Performed by: INTERNAL MEDICINE

## 2017-01-31 PROCEDURE — 65270000029 HC RM PRIVATE

## 2017-01-31 PROCEDURE — 74011250637 HC RX REV CODE- 250/637: Performed by: INTERNAL MEDICINE

## 2017-01-31 PROCEDURE — 74011636637 HC RX REV CODE- 636/637: Performed by: INTERNAL MEDICINE

## 2017-01-31 PROCEDURE — 92526 ORAL FUNCTION THERAPY: CPT

## 2017-01-31 RX ORDER — SULFAMETHOXAZOLE AND TRIMETHOPRIM 800; 160 MG/1; MG/1
1 TABLET ORAL DAILY
Status: DISCONTINUED | OUTPATIENT
Start: 2017-02-01 | End: 2017-01-31

## 2017-01-31 RX ORDER — SULFAMETHOXAZOLE AND TRIMETHOPRIM 800; 160 MG/1; MG/1
1 TABLET ORAL EVERY 12 HOURS
Status: DISCONTINUED | OUTPATIENT
Start: 2017-01-31 | End: 2017-02-02 | Stop reason: HOSPADM

## 2017-01-31 RX ORDER — POTASSIUM CHLORIDE 7.45 MG/ML
10 INJECTION INTRAVENOUS
Status: COMPLETED | OUTPATIENT
Start: 2017-01-31 | End: 2017-02-02

## 2017-01-31 RX ORDER — FUROSEMIDE 10 MG/ML
20 INJECTION INTRAMUSCULAR; INTRAVENOUS 2 TIMES DAILY
Status: DISCONTINUED | OUTPATIENT
Start: 2017-01-31 | End: 2017-02-01

## 2017-01-31 RX ADMIN — PILOCARPINE HYDROCHLORIDE 1 DROP: 10 SOLUTION/ DROPS OPHTHALMIC at 23:47

## 2017-01-31 RX ADMIN — BRIMONIDINE TARTRATE 1 DROP: 1 SOLUTION/ DROPS OPHTHALMIC at 23:49

## 2017-01-31 RX ADMIN — LOTEPREDNOL ETABONATE 1 DROP: 5 SUSPENSION/ DROPS OPHTHALMIC at 10:40

## 2017-01-31 RX ADMIN — POTASSIUM CHLORIDE 10 MEQ: 10 INJECTION, SOLUTION INTRAVENOUS at 17:00

## 2017-01-31 RX ADMIN — BRIMONIDINE TARTRATE 1 DROP: 1 SOLUTION/ DROPS OPHTHALMIC at 10:48

## 2017-01-31 RX ADMIN — BRINZOLAMIDE 1 DROP: 10 SUSPENSION/ DROPS OPHTHALMIC at 23:41

## 2017-01-31 RX ADMIN — MINERAL OIL 1 DROP: 2; 2 EMULSION OPHTHALMIC at 10:38

## 2017-01-31 RX ADMIN — Medication 10 ML: at 22:00

## 2017-01-31 RX ADMIN — SODIUM CHLORIDE 1 DROP: 50 SOLUTION OPHTHALMIC at 23:46

## 2017-01-31 RX ADMIN — PREDNISONE 10 MG: 10 TABLET ORAL at 16:45

## 2017-01-31 RX ADMIN — FUROSEMIDE 20 MG: 10 INJECTION, SOLUTION INTRAMUSCULAR; INTRAVENOUS at 23:20

## 2017-01-31 RX ADMIN — DUREZOL 1 DROP: 0.5 EMULSION OPHTHALMIC at 10:15

## 2017-01-31 RX ADMIN — OLOPATADINE HYDROCHLORIDE 1 DROP: 2 SOLUTION/ DROPS OPHTHALMIC at 10:44

## 2017-01-31 RX ADMIN — BRINZOLAMIDE 1 DROP: 10 SUSPENSION/ DROPS OPHTHALMIC at 18:59

## 2017-01-31 RX ADMIN — DIFLUPREDNATE 1 DROP: 0.5 EMULSION OPHTHALMIC at 09:00

## 2017-01-31 RX ADMIN — PILOCARPINE HYDROCHLORIDE 1 DROP: 10 SOLUTION/ DROPS OPHTHALMIC at 19:00

## 2017-01-31 RX ADMIN — Medication 10 ML: at 09:10

## 2017-01-31 RX ADMIN — CLINDAMYCIN HYDROCHLORIDE 300 MG: 150 CAPSULE ORAL at 05:55

## 2017-01-31 RX ADMIN — BRINZOLAMIDE 1 DROP: 10 SUSPENSION/ DROPS OPHTHALMIC at 10:50

## 2017-01-31 RX ADMIN — PREDNISONE 10 MG: 10 TABLET ORAL at 09:09

## 2017-01-31 RX ADMIN — POTASSIUM CHLORIDE 10 MEQ: 10 INJECTION, SOLUTION INTRAVENOUS at 16:00

## 2017-01-31 RX ADMIN — DIFLUPREDNATE 1 DROP: 0.5 EMULSION OPHTHALMIC at 16:00

## 2017-01-31 RX ADMIN — ASPIRIN 81 MG: 81 TABLET, COATED ORAL at 09:09

## 2017-01-31 RX ADMIN — PILOCARPINE HYDROCHLORIDE 1 DROP: 10 SOLUTION/ DROPS OPHTHALMIC at 02:22

## 2017-01-31 RX ADMIN — CARVEDILOL 12.5 MG: 12.5 TABLET, FILM COATED ORAL at 09:09

## 2017-01-31 RX ADMIN — ESCITALOPRAM OXALATE 10 MG: 10 TABLET ORAL at 09:09

## 2017-01-31 RX ADMIN — DIFLUPREDNATE 1 DROP: 0.5 EMULSION OPHTHALMIC at 23:43

## 2017-01-31 RX ADMIN — CARVEDILOL 12.5 MG: 12.5 TABLET, FILM COATED ORAL at 16:45

## 2017-01-31 RX ADMIN — LEVOFLOXACIN 750 MG: 500 TABLET, FILM COATED ORAL at 16:45

## 2017-01-31 RX ADMIN — BRIMONIDINE TARTRATE 1 DROP: 1 SOLUTION/ DROPS OPHTHALMIC at 19:00

## 2017-01-31 RX ADMIN — FUROSEMIDE 20 MG: 10 INJECTION, SOLUTION INTRAMUSCULAR; INTRAVENOUS at 09:10

## 2017-01-31 RX ADMIN — PILOCARPINE HYDROCHLORIDE 1 DROP: 10 SOLUTION/ DROPS OPHTHALMIC at 10:15

## 2017-01-31 RX ADMIN — CLINDAMYCIN HYDROCHLORIDE 300 MG: 150 CAPSULE ORAL at 12:48

## 2017-01-31 RX ADMIN — SODIUM CHLORIDE 1000 MG: 900 INJECTION, SOLUTION INTRAVENOUS at 16:41

## 2017-01-31 RX ADMIN — SULFAMETHOXAZOLE AND TRIMETHOPRIM 1 TABLET: 800; 160 TABLET ORAL at 23:20

## 2017-01-31 RX ADMIN — BIMATOPROST 1 DROP: 0.1 SOLUTION/ DROPS OPHTHALMIC at 23:37

## 2017-01-31 RX ADMIN — SODIUM CHLORIDE 1 DROP: 50 SOLUTION OPHTHALMIC at 10:43

## 2017-01-31 NOTE — PROGRESS NOTES
Pt Minerva Hernandez, 6 yo female. Pt in bed during bedside report. Currently on bedrest. Contact isolation for MRSA. Pt calls out when no one is in her room. She is unsure why she is calling out when person enters the room. She states she is not calling out. Incontinent care provided. Pt upset about being cleaned, states she is \"done with this\" \"does not want to be cleaned any more\" Pt resting comfortably when care is complete. Antibiotics changed to PO from IV. Pt tolerated PO antibiotic well. Pt states she is anxious and does not know why. Pt vital signs are WNL for the pt. No obvious signs of distress. 2 Pt supplied eye drops need refilled. [facility] pharmacy and daughter have been notified. [facility] pharmacy has eye drops on order and they will be available tomorrow. Pt grandson notified. Pt in bed during bedside report.

## 2017-01-31 NOTE — PROGRESS NOTES
NUTRITION update    ASSESSMENT/PLAN:     Current Diet: Pureed (NDD1)     Chart reviewed due to length of stay, noted goals of care focused on comfort measures only. Aggressive nutrition intervention is not indicated at this time. Will assist as needed; please consult if nutrition intervention is medically indicated and consistent with patient's goals of care.       Josi Young, RD  Pager:  [phone]

## 2017-01-31 NOTE — PROGRESS NOTES
Hospitalist Progress Note    Patient: Janeth Thompson MRN: [id number]  CSN: [id number]    YOB: 1921  Age: 80 y.o. Sex: female    DOA: 1/25/2017 LOS:  LOS: 6 days          Chief Complaint:    sepsis      Assessment/Plan     -Acute respiratory failure 2/2 pneumonia. Changed to PO levaquin per ID-NOT on comfort measures now as per family requests  -Sepsis 2/2 Pneumonia. E coli UTI-changed to PO bactrim  -MRSA bacteremia. Cx dated 1/25/17. For picc and to continue month of vanco  -Hypernatremia/hypokalemia  -Protein malnutrition moderate severity poa.   -Anemia normochromic normocytic.  -Glaucoma. She has a very intense regimen of eye drops confirmed by daughter-basically blind at this juncture  -Hx ppm placement for 3rd degree block. -Dyslipidemia.  -DM.     PO levaq, bactrim  IV vanco  Started lasix this am as I believe she has some volume overload  Can repeat CXR in am  Family declined comfort measures thus orders placed by pall care team are reversed  DNR status  resp treatments as needed  Prognosis is very poor    Patient Active Problem List   Diagnosis Code    Hypertension I10    DM (diabetes mellitus) ([facility] [address] E11.9    A-fib ([address] I48.91    Third degree heart block ([address] I44.2    COPD with acute exacerbation ([address] J44.1    UTI (urinary tract infection) N39.0    Sepsis ([address] A41.9    Acute respiratory failure ([address] J96.00    MRSA bacteremia R78.81    HCAP (healthcare-associated pneumonia) J18.9    Long QT interval R94.31       Subjective:        Review of systems:    Constitutional: denies fevers, chills, myalgias  Respiratory: denies SOB, cough  Cardiovascular: denies chest pain, palpitations  Gastrointestinal: denies nausea, vomiting, diarrhea      Vital signs/Intake and Output:  Visit Vitals    /86 (BP 1 Location: Right arm, BP Patient Position: At rest)    Pulse 66    Temp 98 °F (36.7 °C)    Resp 16    Ht 5' 6\" (1.676 m)    Wt 71.4 kg (157 lb 6.4 oz)    SpO2 90%    BMI 25.41 kg/m2     Current Shift:  01/31 0701 - 01/31 1900  In: 925 [I.V.:925]  Out: -   Last three shifts:  01/29 1901 - 01/31 0700  In: 350 [I.V.:350]  Out: -     Exam:    General: debilitated weak WF, weak, anxious, multiple brusing of UE  Head/Neck: NCAT, supple, No masses, No lymphadenopathy  CVS:Regular rate and rhythm, no M/R/G, S1/S2 heard, no thrill  Lungs:coarse BS, dec BS bases, no wheezes  Abdomen: Soft, Nontender, No distention, Normal Bowel sounds, No hepatomegaly  Extremities: No C/C/E, pulses palpable 2+  Skin:multiple bruising UE  Neuro:grossly normal , follows commands  Psych:appropriate                Labs: Results:       Chemistry Recent Labs      01/30/17   0616  01/29/17   0152   GLU  120*  138*   NA  149*  145   K  3.3*  4.7   CL  113*  113*   CO2  26  22   BUN  40*  48*   CREA  1.08  1.10   CA  8.6  8.5   AGAP  10  10   BUCR  37*  44*   AP  38*  42*   TP  5.2*  5.2*   ALB  2.3*  2.0*   GLOB  2.9  3.2   AGRAT  0.8  0.6*      CBC w/Diff Recent Labs      01/30/17   0616  01/29/17   0152   WBC  9.4  8.5   RBC  3.46*  3.47*   HGB  10.2*  10.2*   HCT  32.8*  33.5*   PLT  236  215   GRANS  72  80*   LYMPH  18*  14*   EOS  0  0      Cardiac Enzymes No results for input(s): CPK, CKND1, LILIANA in the last 72 hours. No lab exists for component: CKRMB, TROIP   Coagulation No results for input(s): PTP, INR, APTT in the last 72 hours. No lab exists for component: INREXT, INREXT    Lipid Panel Lab Results   Component Value Date/Time    Cholesterol, total 147 06/24/2012 02:15 AM    HDL Cholesterol 46 06/24/2012 02:15 AM    LDL, calculated 78.8 06/24/2012 02:15 AM    VLDL, calculated 22.2 06/24/2012 02:15 AM    Triglyceride 111 06/24/2012 02:15 AM    CHOL/HDL Ratio 3.2 06/24/2012 02:15 AM      BNP No results for input(s): BNPP in the last 72 hours.    Liver Enzymes Recent Labs      01/30/17   0616   TP  5.2*   ALB  2.3*   AP  38*   SGOT  16      Thyroid Studies Lab Results   Component Value Date/Time TSH 0.16 01/29/2017 01:52 AM        Procedures/imaging: see electronic medical records for all procedures/Xrays and details which were not copied into this note but were reviewed prior to creation of Bonifacio Gómez MD

## 2017-01-31 NOTE — PROGRESS NOTES
Pt placed on comfort care @ 1200 pm IV fluids d/ronnell. 80- grandson in and expresses concerns over pt not eating. Daughter is requesting pt to be restarted on fluids as she is in pain due to dry eyes. Fluids restarted per Dr. Marcy Dean.

## 2017-01-31 NOTE — ROUTINE PROCESS
Bedside and Verbal shift change report given to REJI Montana RN  (oncoming nurse) by  REJI Buitrago RN  (offgoing nurse). Report included the following information SBAR, Kardex, Recent Results and Med Rec Status.

## 2017-01-31 NOTE — PROGRESS NOTES
vanco  Levoflox  clinda ==> bactrim    Rec: 1. Bacteremia    mrsa    Low grade; occult source      F/u blood culture negative    Ppm site benign    Cannot be confident this represents acquisition contaminant      By report - disposition has changed     i am told that this patient is no longer candidate for comfort measures alone     No family members in attendance    ==> picc    ==> restart vanco    ==> check tte    ==> will likely benefit from 4 weeks iv abx - repeat blood culture after course complete   2. Pneumonia    Rt basilar infiltrate    i suspect due to aspiration      ==> aspiration precautions    ==> hold clinda    ==> empiric levoflox    ==> serial cxr   3. uti    Active sediment    No clinical evidence pyelonephritis    ==> add bactrim po   D/w patient - no family members in attendence     Subjective: Awake/ alert - \"really uncomfortable - i want to lie down flat\"    No n/v/d    little cough/sputum    No abd pain     No rash    PE:   Visit Vitals    /86 (BP 1 Location: Right arm, BP Patient Position: At rest)    Pulse 66    Temp 98 °F (36.7 °C)    Resp 16    Ht 5' 6\" (1.676 m)    Wt 71.4 kg (157 lb 6.4 oz)    SpO2 90%    BMI 25.41 kg/m2     Awake/ alert - oriented x 2   Heent: O/c clear. Ecchymoses. No icterus. No thrush No ulcer. Neck: Supple. No jvd   Chest: scattered rt basilar crackles No exp wheeze. Left chest ppm site benign   CV: Nl s1s2 No murmurs   Abd: Soft NTND no reboun No masses   Ext:    Tr edema No rash. Ecchymoses. No phlebitis    Lab:   No results found for this or any previous visit (from the past 24 hour(s)).    cxr indepedntly reviewed   F/u blood cultures sterile  Meds:     Current Facility-Administered Medications   Medication Dose Route Frequency Provider Last Rate Last Dose    furosemide (LASIX) injection 20 mg  20 mg IntraVENous BID Trista Christianson MD   20 mg at 01/31/17 0910    haloperidol (HALDOL) 2 mg/mL oral solution 2 mg  2 mg Oral Q6H PRN Dakota Leon NP 2 mg at 01/30/17 1538    promethazine (PHENERGAN) suppository 25 mg  25 mg Rectal Q6H PRN Ethlyn Linker, NP        clindamycin (CLEOCIN) capsule 300 mg  300 mg Oral Q6H Ninfa Marquis MD   300 mg at 01/31/17 1248    oxyCODONE (ROXICODONE INTENSOL) 20 mg/mL concentrated solution 5 mg  5 mg Oral Q2H PRN Ethlyn Paul, NP   5 mg at 01/30/17 2213    [START ON 2/1/2017] levoFLOXacin (LEVAQUIN) tablet 750 mg  750 mg Oral Q48H Ninfa Marquis MD        predniSONE (DELTASONE) tablet 10 mg  10 mg Oral BID WITH MEALS Coty Knowles MD   10 mg at 01/31/17 3697    loteprednol etabonate (LOTEMAX) 0.5 % ophthalmic suspension 1 Drop  1 Drop Right Eye DAILY Baron Pancho MD   1 Drop at 01/31/17 1040    sodium chloride (NS) flush 5-10 mL  5-10 mL IntraVENous Q8H Anel Campbell MD   10 mL at 01/31/17 0910    sodium chloride (NS) flush 5-10 mL  5-10 mL IntraVENous PRN Anel Campbell MD        albuterol-ipratropium (DUO-NEB) 2.5 MG-0.5 MG/3 ML  3 mL Nebulization Q4H PRN Caitlyn Singh MD        aspirin delayed-release tablet 81 mg  81 mg Oral DAILY Caitlyn Singh MD   81 mg at 01/31/17 4956    azithromycin (AZASITE) 1 % ophthalmic solution 1 Drop  1 Drop Left Eye QHS Caitlyn Singh MD   1 Drop at 01/30/17 2235    bimatoprost (LUMIGAN) 0.01 % ophthalmic drops 1 Drop  1 Drop Right Eye QHS Caitlyn Singh MD   1 Drop at 01/30/17 2237    brimonidine (ALPHAGAN P) 0.1 % ophthalmic solution 1 Drop  1 Drop Both Eyes TID Caitlyn Singh MD   1 Drop at 01/31/17 1048    brinzolamide (AZOPT) 1 % ophthalmic suspension 1 Drop  1 Drop Both Eyes TID Caitlyn Singh MD   1 Drop at 01/31/17 1050    carvedilol (COREG) tablet 12.5 mg  12.5 mg Oral BID WITH MEALS Caitlyn Singh MD   12.5 mg at 01/31/17 0909    Difluprednate (DUREZOL) 0.05 % ophthalmic emulsion 1 Drop  1 Drop Right Eye Q7D Caitlyn Singh MD   1 Drop at 01/31/17 1015    Difluprednate (DUREZOL) 0.05 % ophthalmic emulsion 1 Drop  1 Drop Left Eye TID Raji Serum Tammy Ascencio MD   1 Drop at 01/31/17 0900    escitalopram oxalate (LEXAPRO) tablet 10 mg  10 mg Oral DAILY Jasmeet Oconnell MD   10 mg at 01/31/17 0909    pilocarpine (PILOCAR) 1 % ophthalmic solution 1 Drop  1 Drop Right Eye Q6H Jasmeet Oconnell MD   1 Drop at 01/31/17 1015    olopatadine (PATADAY) ophthalmic solution 0.2 % -  1 Drop  1 Drop Both Eyes DAILY Jasmeet Oconnell MD   1 Drop at 01/31/17 1044    sodium chloride (MAHESH-5) 5 % ophthalmic solution 1 Drop  1 Drop Left Eye BID Jasmeet Oconnell MD   1 Drop at 01/31/17 1043    acetaminophen (TYLENOL) tablet 650 mg  650 mg Oral Q6H PRN Oni Lawrence MD   650 mg at 01/30/17 1154    light mineral oil-min oil (PF) 0.5-0.5 % dpet 1 Drop  1 Drop Ophthalmic QID Jasmeet Oconnell MD   1 Drop at 01/31/17 915 [address]  [phone](pg)

## 2017-01-31 NOTE — PROGRESS NOTES
Shift Summary :  Awake most of shift at times yelling out other times pressing call bell. Doesn't know why she calls or just wants someone at the bedside. Ice chips, mouth care, incontinence care during the shift. Swallowed pills well with applesauce.

## 2017-01-31 NOTE — PROGRESS NOTES
Palliative Medicine Progress Note  [facility]: [phone] ([phone])  [facility]: [phone]   [facility]: [phone]    Patient Name: Nikolai Beach  YOB: 1921    Date of Initial Consult: 1/30/17   Reason for Consult: Care Decisions  Requesting Provider: Deborah Anthony MD   Primary Care Physician: Fabiana Robins MD      SUMMARY:   Nikolai Beach is a 80y.o. year old with a past history of DM, HTN, COPD, Afib, who was admitted on 1/25/2017 from [facility]  with a diagnosis of:    COPD with exacerbation  Sepsis  Acute respiratory failure  MRSA bacteremia  HCAP pneumonia  Chronic a fib  Long QT interval  DM  HTN  A-fib  Third degree block  UTI        Current medical issues leading to Palliative Medicine involvement include: Care Decisions in setting of advanced age with little improvement with treatment and patient's desire to return home. Presented with increasing cough, shortness of breath, and was not responding to outpatient antimicrobial therapy given at [facility]. She is chronically on oxygen, but she was saturating in the 80% PTA. Blood cultures here demonstrated MRSA. Followup blood cultures are sterile. The patient has been treated with broad-spectrum antimicrobial therapy and states and she feels no better. At baseline, can stand and transfer to a chair, but she is otherwise not ambulatory. Has been at [facility] for about 6 years. Dr. Iftikhar Ayala discussed a change in [address] with daughter as there has been little improvement noted with patient repeatedly stating she does not wish to be here and wants to go home. Daughter states her mother in the past has always told her that she \"doesn't want to die\" and always seems to bounce back. She was \"near death\" about 2 years ago and recovered. She has had no hospitalizations in the past 1-2 years. She last walked about 2 years ago.  Daughter aware that benefits of treatment will begin to decline and believes this is what may be happening now. Daughter wishes for her mother to make decision about [address].     1/31/17: Overnight patient's grandson decided it was essential for her to receive IVF and daughter asked for nursing to reinitiate. Became confused overnight. Somewhat confused this am, denies complaint. PALLIATIVE DIAGNOSES:   1. Acute respiratory failure  2. Pneumonia  3. Dyspnea  4. Debility   5. Comfort Care       PLAN:   1. Prior to discussing with patient, we spoke with daughter Dung Severino and son in law and clarified the difference between aggressive care and comfort plan and what each would mean. Daughter also voiced concerns that she believes her mother has a fear of dying. She indicated that her mother worries about many things and can become agitated. Daughter felt her mother was able to make her own decision. Palliative Medicine team Yaquelin Chavez MD, [facility] NP)  met with patient at bedside. She is alert and has a relatively good understanding of her current medical condition. She was asked by us and separately by her daughter her preference for continuation of aggressive care with continued hospitalization and IV antibiotics versus a choice to return to her home at [facility] with oral antibiotics with reduced chance that she will improve. She agreed with plan to return to [facility] with comfort as goal and to receive oral antibiotics with the understanding this is less aggressive treatment and may result in a more rapid decline. She indicated that she knows at some point she will die at her age of 80, almost 80 and accepts this. Her hope is to be more comfortable and she stated \"I can't stay here\" as it was too uncomfortable for her. Plan is to begin comfort care here and continue eye drops and oral antibiotics and return to [facility] with hospice Romayne Duster?). Comfort Care orders placed. Will defer to Attending, Dr. Damon Fleming, to change to oral antibiotics as discussed.     1/31/17: Call made to daughter Dung Severino who confirms that despite her mother's stated wishes over the weekend and confirmed in our meeting yesterday, they would now like to pursue full aggressive measures to include IVF and complete standard course of IV abx for her sepsis and pneumonia. Discussed with Ambika Blair and Dr. Josefina Cuello. Psychosocial/Functional status:  in . Worked as a   3 children--one . Has always relied on daughter Rebeca Cue more that other daughter who lives in [place] and moved here 20 years ago to be closer  Resides at [facility] for past 6 years. Active socially at [facility].      2. Advance Care Planning: Has AD per daughter who states both she and her spouse are listed as MPOAs. She will provide a copy. Code Status: DNR    Durable DNR status: Has existing DDNR. 4. Symptoms: none    5. Disposition: TBD but will likely return to [facility]. 6. Initial consult note routed to primary continuity provider. 7. Communicated plan of care with: Palliative IDT, patient, family, nurse, Dr. Ra Sapp.        GOALS OF CARE:     [====Goals of Care====]  GOALS OF CARE:  Patient / health care proxy stated goals:Plan has changed from comfort to aggressive/curative    TREATMENT PREFERENCES:   Code Status: DNR    Advance Care Planning:  Advance Care Planning 2017   Patient's Healthcare Decision Maker is: Legal Next of Kin   Primary Decision Maker Name Davide   Primary Decision Maker Phone Number [phone]   Primary Decision Maker Relationship to Patient Adult child   Secondary Decision Maker Name Renetta Swift    Secondary Decision Maker Relationship to Patient Adult child   Confirm Advance Directive Yes, on file   Does the patient have other document types -       Other:    The palliative care team has discussed with patient / health care proxy about goals of care / treatment preferences for patient.  [====Goals of Care====]      Advance Care Planning 2017   Patient's Healthcare Decision Maker is: Legal Next of Kin   Primary Decision Maker Name [facility]   Primary Decision Maker Phone Number [phone]   Primary Decision Maker Relationship to Patient Adult child   Secondary Decision Maker Name Diamond James    Secondary Decision Maker Relationship to Patient Adult child   Confirm Advance Directive Yes, on file   Does the patient have other document types -      [facility] [phone]     HISTORY:     History obtained from: chart, patient, daughter    CHIEF COMPLAINT: SOB    HPI/SUBJECTIVE:    The patient is:   [x] Verbal and participatory  [] Non-participatory due to:      SEE SUMMARY    Clinical Pain Assessment (nonverbal scale for nonverbal patients): Pain: 0         FUNCTIONAL ASSESSMENT:     Palliative Performance Scale (PPS):  PPS: 40       PSYCHOSOCIAL/SPIRITUAL SCREENING:     Advance Care Planning:  Advance Care Planning 1/25/2017   Patient's Healthcare Decision Maker is: Legal Next of Kin   Primary Decision Maker Name [facility]   Primary Decision Maker Phone Number [phone]   Primary Decision Maker Relationship to Patient Adult child   Secondary Decision Maker Name Diamond James    Secondary Decision Maker Relationship to Patient Adult child   Confirm Advance Directive Yes, on file   Does the patient have other document types -        Any spiritual / Sabianism concerns:  [x] Yes /  [] No  Daughter identified a 'fear of dying'   Will contact  for assistance. Caregiver Burnout:  [] Yes /  [x] No /  [] No Caregiver Present      Anticipatory grief assessment:   [x] Normal  / [] Maladaptive       ESAS Anxiety: Anxiety: 4     ESAS Depression:   not addressed at this visit       REVIEW OF SYSTEMS:     Positive and pertinent negative findings in ROS are noted above in HPI. The following systems were [x] reviewed / [] unable to be reviewed as noted in HPI   Other findings are noted below.   Systems: constitutional, ears/nose/mouth/throat, respiratory, gastrointestinal, genitourinary, musculoskeletal, integumentary, neurologic, psychiatric, endocrine. Positive findings noted below. Modified ESAS Completed by: provider           Pain: 0   Anxiety: 4     Anorexia: 2 Dyspnea: 0           Stool Occurrence(s): 1        PHYSICAL EXAM:     Wt Readings from Last 3 Encounters:   01/27/17 71.4 kg (157 lb 6.4 oz)   08/26/15 81.1 kg (178 lb 12.8 oz)   05/14/15 73.9 kg (163 lb)     Blood pressure 151/80, pulse (!) 57, temperature 97.5 °F (36.4 °C), resp. rate 16, height 5' 6\" (1.676 m), weight 71.4 kg (157 lb 6.4 oz), SpO2 93 %. Pain:  Pain Scale 1: FACES  Pain Intensity 1: 0     Pain Location 1: Leg  Pain Orientation 1: Right  Pain Description 1: Sore  Pain Intervention(s) 1: Medication (see MAR)  Last bowel movement:     Constitutional: NAD. Alert and pleasant.   Eyes: pupils equal, anicteric  ENMT: no nasal discharge, moist mucous membranes  Cardiovascular: regular rhythm, no pedal edema  Respiratory: breathing not labored, symmetric  Gastrointestinal: soft non-tender, +bowel sounds  Musculoskeletal: no deformity, no tenderness to palpation  Skin: warm, dry  Neurologic: following commands, moving all extremities  Psychiatric: full affect, no hallucinations  Other:       HISTORY:     Principal Problem:    Acute respiratory failure ([address] (1/25/2017)    Active Problems:    Hypertension ()      DM (diabetes mellitus) ([address] (5/21/2012)      A-fib ([address] (6/24/2012)      Third degree heart block ([address] (5/9/2015)      COPD with acute exacerbation ([address] (8/21/2015)      UTI (urinary tract infection) (1/25/2017)      Sepsis ([address] (1/25/2017)      MRSA bacteremia (1/27/2017)      HCAP (healthcare-associated pneumonia) (1/27/2017)      Long QT interval (1/27/2017)      Dyspnea (1/30/2017)      Debility (1/30/2017)      Past Medical History   Diagnosis Date    Adhesive capsulitis     Anemia     Anxiety     Arthritis     Asthma     Atrial fibrillation ([address]     CAD (coronary artery disease)     COPD     Diabetes ([address]     Diverticulosis     GERD (gastroesophageal reflux disease)     Hypertension     Osteoporosis     Peripheral [facility]     Psychiatric disorder      depression    Thromboembolus ([facility]     Unspecified sleep apnea      no longer wears CPAP. Past Surgical History   Procedure Laterality Date    Hx colectomy      Hx appendectomy      Hx gyn       hysterectomy    Hx heent       tonsillectomy    Hx hemorrhoidectomy      Hx corneal transplant        Family History   Problem Relation Age of Onset    Heart Disease Mother     Cancer Father     Cancer Sister      History reviewed, no pertinent family history.   Social History   Substance Use Topics    Smoking status: Never Smoker    Smokeless tobacco: Never Used    Alcohol use No     Allergies   Allergen Reactions    Latex Rash    Codeine Other (comments)     Pt unable to answer reaction      Current Facility-Administered Medications   Medication Dose Route Frequency    furosemide (LASIX) injection 20 mg  20 mg IntraVENous BID    haloperidol (HALDOL) 2 mg/mL oral solution 2 mg  2 mg Oral Q6H PRN    promethazine (PHENERGAN) suppository 25 mg  25 mg Rectal Q6H PRN    clindamycin (CLEOCIN) capsule 300 mg  300 mg Oral Q6H    oxyCODONE (ROXICODONE INTENSOL) 20 mg/mL concentrated solution 5 mg  5 mg Oral Q2H PRN    [START ON 2/1/2017] levoFLOXacin (LEVAQUIN) tablet 750 mg  750 mg Oral Q48H    predniSONE (DELTASONE) tablet 10 mg  10 mg Oral BID WITH MEALS    loteprednol etabonate (LOTEMAX) 0.5 % ophthalmic suspension 1 Drop  1 Drop Right Eye DAILY    sodium chloride (NS) flush 5-10 mL  5-10 mL IntraVENous Q8H    sodium chloride (NS) flush 5-10 mL  5-10 mL IntraVENous PRN    albuterol-ipratropium (DUO-NEB) 2.5 MG-0.5 MG/3 ML  3 mL Nebulization Q4H PRN    aspirin delayed-release tablet 81 mg  81 mg Oral DAILY    azithromycin (AZASITE) 1 % ophthalmic solution 1 Drop  1 Drop Left Eye QHS    bimatoprost (LUMIGAN) 0.01 % ophthalmic drops 1 Drop  1 Drop Right Eye QHS    brimonidine (ALPHAGAN P) 0.1 % ophthalmic solution 1 Drop  1 Drop Both Eyes TID    brinzolamide (AZOPT) 1 % ophthalmic suspension 1 Drop  1 Drop Both Eyes TID    carvedilol (COREG) tablet 12.5 mg  12.5 mg Oral BID WITH MEALS    Difluprednate (DUREZOL) 0.05 % ophthalmic emulsion 1 Drop  1 Drop Right Eye Q7D    Difluprednate (DUREZOL) 0.05 % ophthalmic emulsion 1 Drop  1 Drop Left Eye TID    escitalopram oxalate (LEXAPRO) tablet 10 mg  10 mg Oral DAILY    pilocarpine (PILOCAR) 1 % ophthalmic solution 1 Drop  1 Drop Right Eye Q6H    olopatadine (PATADAY) ophthalmic solution 0.2 % -  1 Drop  1 Drop Both Eyes DAILY    sodium chloride (MAHESH-5) 5 % ophthalmic solution 1 Drop  1 Drop Left Eye BID    acetaminophen (TYLENOL) tablet 650 mg  650 mg Oral Q6H PRN    light mineral oil-min oil (PF) 0.5-0.5 % dpet 1 Drop  1 Drop Ophthalmic QID        LAB AND IMAGING FINDINGS:     Lab Results   Component Value Date/Time    WBC 9.4 01/30/2017 06:16 AM    HGB 10.2 01/30/2017 06:16 AM    PLATELET 950 42/87/9835 06:16 AM     Lab Results   Component Value Date/Time    Sodium 149 01/30/2017 06:16 AM    Potassium 3.3 01/30/2017 06:16 AM    Chloride 113 01/30/2017 06:16 AM    CO2 26 01/30/2017 06:16 AM    BUN 40 01/30/2017 06:16 AM    Creatinine 1.08 01/30/2017 06:16 AM    Calcium 8.6 01/30/2017 06:16 AM    Magnesium 1.9 01/30/2017 06:16 AM    Phosphorus 2.9 01/30/2017 06:16 AM      Lab Results   Component Value Date/Time    AST 16 01/30/2017 06:16 AM    Alk.  phosphatase 38 01/30/2017 06:16 AM    Protein, total 5.2 01/30/2017 06:16 AM    Albumin 2.3 01/30/2017 06:16 AM    Globulin 2.9 01/30/2017 06:16 AM     Lab Results   Component Value Date/Time    INR 1.3 01/25/2017 09:40 AM    Prothrombin time 15.9 01/25/2017 09:40 AM    aPTT 20.3 01/25/2017 09:40 AM      No results found for: IRON, FE, TIBC, IBCT, PSAT, FERR   No results found for: PH, PCO2, PO2  No components found for: [place]   Lab Results   Component Value Date/Time    CK 40 01/25/2017 09:40 AM    CK - MB <0.5 01/25/2017 09:40 AM              Total time: 25  Counseling / coordination time: 20  > 50% counseling / coordination      Ignacio Zuleta MD  Palliative Medicine

## 2017-01-31 NOTE — ACP (ADVANCE CARE PLANNING)
No Advance Directive has been produced. Family states they have and will bring a copy for the chart. According to family, Primary decision maker is daughterJenn [phone]. Secondary is daughter, [place] no phone information provided. DDNR is in the EMR. This was signed by daughterGisell 01/02/13.

## 2017-01-31 NOTE — PROGRESS NOTES
conducted a Follow up consultation and Spiritual Assessment for [place], who is a 80 y. o.,female. The  provided the following Interventions:  Continued the relationship of care and support with patient and family present. Patient is Palliative Care. Listened empathically. Offered assurance of prayer on patient's behalf. Chart reviewed. The following outcomes were achieved:  Patient expressed gratitude for pastoral care visit. Assessment:   responded to Palliative Care request to visit patient. Patient had expressed some fear concerning her death and being in a grave.  discussed these concerns with the patient, who expressed that she was not afraid, but she does \"not like the idea of being covered with dirt. \"  [address] gave her Scripture reference for comfort. Patient stated there were no further issues at this time. There are no further spiritual or Sikh issues which require Spiritual Care Services interventions at this time. Plan:  Chaplains will continue to follow and will provide pastoral care on an as needed/requested basis.  recommends bedside caregivers page  on duty if patient shows signs of acute spiritual or emotional distress. Rev.  [facility]  [phone]

## 2017-01-31 NOTE — PROGRESS NOTES
Pharmacy - Vancomycin Dosing  Consult provided for this 80y.o. year old ,female for indication of Bloodstream Infection. Therapy day 1        Wt Readings from Last 1 Encounters:   01/27/17 71.4 kg (157 lb 6.4 oz)       Ht Readings from Last 1 Encounters:   01/25/17 167.6 cm (66\")       Dosing Weight:  71.4 kg      Additional Antibiotics:  Bactrim, Levofloxacin    Date  1/31/17  Lab Results   Component Value Date/Time    Creatinine 1.08 01/30/2017 06:16 AM     creatinine clearance  31.5 ml/min    Will re-start Vancomycin at 1000 mg IV q24hrs. Trough after 3-4 doses infused. Dose calculated to approximate a therapeutic trough of 15-20mcg/mL. Pharmacy to follow daily and will make changes to dose and/or frequency based on clinical status.       [address], [address] [place] [address]

## 2017-01-31 NOTE — PROGRESS NOTES
Reviewed chart and spoke with Dr. Darius Craig and Dr. Camryn Granger, who stated that last evening, pt and family decided to pursue medical treatment , so pt is not ready for discharge today to [place] [address]; CM called daughter, Cooper Chauhan and left VM; CHIOMA called and spoke with Prabhjot Curiel at [facility] to notify them of the above, and she states that [facility] is scheduled to meet with family today around noon at the hospital for informational consult. CHIOMA also notified Jazmine at [place] [address] of all the above, and will continue to send updates to [place] [address] re: when pt will be ready for discharge. CHIOMA spoke with Sherrie Louie from [facility], who came to hospital today to meet with family for informational hospice consult. CHIOMA explained to Ms. Machado that at this time family and pt are still pursuing aggressive treatment.  If this changes, Miguel Reynoso will be available to assist.

## 2017-01-31 NOTE — ROUTINE PROCESS
Bedside and Verbal shift change report given to REJI Urban RN (oncoming nurse) by Jess Singh RN (offgoing nurse). Report included the following information SBAR, Kardex, Intake/Output, MAR and Recent Results.

## 2017-01-31 NOTE — PROGRESS NOTES
Problem: Dysphagia (Adult)  Goal: *Acute Goals and Plan of Care (Insert Text)  Dysphagia Present: moderate  Aspiration: at risk     Recommendations:  Diet: full liquid nectar-thick; ice chips PRN for comfort  Meds: crushed in puree  Aspiration Precautions  Other: 1:1 feeding assistance, safe swallow strategies    Goals: Patient will:  1. Tolerate PO trials with 0 s/s overt distress in 4/5 trials  2. Utilize compensatory swallow strategies/maneuvers (decrease bite/sip, size/rate, alt. liq/sol) with min cues in 4/5 trials  3. Perform oral-motor/laryngeal exercises to increase oropharyngeal swallow function with min cues  4. Complete an objective swallow study (i.e., MBSS) to assess swallow integrity, r/o aspiration, and determine of safest LRD, min A     Outcome: Progressing Towards Goal  SPEECH LANGUAGE PATHOLOGY DYSPHAGIA TREATMENT     Patient: Pamela Smith (89 y.o. female)  Date: 1/31/2017  Diagnosis: Respiratory distress  Pneumonia  Sepsis ([facility] [address]  UTI (urinary tract infection)  COPD exacerbation ([facility]) Acute respiratory failure ([facility])       Precautions: aspiration Fall, DNR      ASSESSMENT:  Moderate oropharyngeal dysphagia     Dysphagia f/u completed this PM with daughter Alli Cotter) at bedside. Reports pt with minimal PO intake of solid diet however more accepting of liquids. Tolerated nectar-thick liquids +straw single and successive sips without event. Delayed congested cough post thin liquids +straw single sips. Educated Ivana re: aspiration risk, dysphagia, diet consistencies and strategies to maximize safety with PO intake. Ivana voiced understanding, agreeable to continue nectar-thick liquids with modification to full liquid diet to maximize efficiency of intake. Further voiced understanding of aspiration risk and desire to continue ice chips/water PRN for comfort. ST to f/u for ongoing education, diet tolerance and swallow strategy training.       Progression toward goals:  [ ]         Improving appropriately and progressing toward goals  [X]         Improving slowly and progressing toward goals  [ ]         Not making progress toward goals and plan of care will be adjusted       PLAN: full liquid nectar-thick, 1:1 feeding assistance, safe swallow strategies  Recommendations and Planned Interventions:  ST to f/u for diet tolerance, education, swallow strategy training  Patient continues to benefit from skilled intervention to address the above impairments. Continue treatment per established plan of care. Discharge Recommendations:  [address]       SUBJECTIVE:   Patient stated I don't feel like eating anything right now. OBJECTIVE:   Cognitive and Communication Status:  Neurologic State: Alert  Orientation Level: Oriented to person, Oriented to place, Disoriented to situation, Disoriented to time  Cognition: Decreased attention/concentration  Perception: Appears intact  Perseveration: No perseveration noted  Safety/Judgement: Decreased insight into deficits  Dysphagia Treatment:  Oral Assessment:  Oral Assessment  Labial: Decreased rate  Dentition: Upper & lower dentures  Oral Hygiene:  (Good)  Lingual: Decreased rate  Velum: No impairment  Mandible: No impairment  Gag Reflex:  (Not elicited)  P.O.  Trials:              Patient Position: HOB 40              Vocal quality prior to P.O.: Fatigue              Consistency Presented: Nectar thick liquid, Thin liquid              How Presented: SLP-fed/presented, Straw              How Much:  (x3 thin, x6 NTL)              Bolus Acceptance: No impairment              Bolus Formation/Control: Impaired              Type of Impairment: Delayed              Propulsion: Delayed (# of seconds)              Oral Residue: None              Initiation of Swallow: Delayed (# of seconds)              Laryngeal Elevation: Decreased              Aspiration Signs/Symptoms: Delayed cough/throat clear, Infiltrate on chest xray (post thin) Pharyngeal Phase Characteristics: Double swallow (thin)              Effective Modifications: Small sips and bites              Cues for Modifications:  (1:1 feeding assistance)                                Oral Phase Severity: Moderate              Pharyngeal Phase Severity : Moderate                                                                                                                                                                                                                                                                                                                                                                                                                                                                                                                                                                                                                                                                                                                                                                                                                                                                                                                                                              PAIN:  Start of Tx: 0  End of Tx: 0      After treatment:   [ ]              Patient left in no apparent distress sitting up in chair  [X]              Patient left in no apparent distress in bed  [X]              Call bell left within reach  [X]              Nursing notified  [X]              Family present  [ ]              Caregiver present  [ ]              Bed alarm activated         COMMUNICATION/EDUCATION:   [X]        Aspiration precautions; swallow safety; compensatory techniques  [ ]        Patient unable to participate in education; education ongoing with staff  [ ]         Posted safety precautions in patient's room.   [ ]         Oral-motor/laryngeal strengthening exercises        PEGGY Ariza  Time Calculation: 15 mins

## 2017-02-01 ENCOUNTER — APPOINTMENT (OUTPATIENT)
Dept: GENERAL RADIOLOGY | Age: 82
DRG: 871 | End: 2017-02-01
Attending: HOSPITALIST
Payer: MEDICARE

## 2017-02-01 ENCOUNTER — APPOINTMENT (OUTPATIENT)
Dept: INTERVENTIONAL RADIOLOGY/VASCULAR | Age: 82
DRG: 871 | End: 2017-02-01
Attending: INTERNAL MEDICINE
Payer: MEDICARE

## 2017-02-01 LAB
ANION GAP BLD CALC-SCNC: 7 MMOL/L (ref 3–18)
BACTERIA SPEC CULT: NORMAL
BACTERIA SPEC CULT: NORMAL
BUN SERPL-MCNC: 27 MG/DL (ref 7–18)
BUN/CREAT SERPL: 28 (ref 12–20)
CALCIUM SERPL-MCNC: 8.4 MG/DL (ref 8.5–10.1)
CHLORIDE SERPL-SCNC: 104 MMOL/L (ref 100–108)
CO2 SERPL-SCNC: 32 MMOL/L (ref 21–32)
CREAT SERPL-MCNC: 0.97 MG/DL (ref 0.6–1.3)
GLUCOSE BLD STRIP.AUTO-MCNC: 114 MG/DL (ref 70–110)
GLUCOSE SERPL-MCNC: 112 MG/DL (ref 74–99)
POTASSIUM SERPL-SCNC: 3 MMOL/L (ref 3.5–5.5)
SERVICE CMNT-IMP: NORMAL
SERVICE CMNT-IMP: NORMAL
SODIUM SERPL-SCNC: 143 MMOL/L (ref 136–145)

## 2017-02-01 PROCEDURE — 71010 XR CHEST PORT: CPT

## 2017-02-01 PROCEDURE — 74011250636 HC RX REV CODE- 250/636: Performed by: INTERNAL MEDICINE

## 2017-02-01 PROCEDURE — C1751 CATH, INF, PER/CENT/MIDLINE: HCPCS

## 2017-02-01 PROCEDURE — 74011250637 HC RX REV CODE- 250/637: Performed by: HOSPITALIST

## 2017-02-01 PROCEDURE — 36415 COLL VENOUS BLD VENIPUNCTURE: CPT | Performed by: INTERNAL MEDICINE

## 2017-02-01 PROCEDURE — 77010033678 HC OXYGEN DAILY

## 2017-02-01 PROCEDURE — 65270000029 HC RM PRIVATE

## 2017-02-01 PROCEDURE — 74011000250 HC RX REV CODE- 250: Performed by: HOSPITALIST

## 2017-02-01 PROCEDURE — 76937 US GUIDE VASCULAR ACCESS: CPT

## 2017-02-01 PROCEDURE — 92526 ORAL FUNCTION THERAPY: CPT

## 2017-02-01 PROCEDURE — 74011250636 HC RX REV CODE- 250/636: Performed by: HOSPITALIST

## 2017-02-01 PROCEDURE — 74011636637 HC RX REV CODE- 636/637: Performed by: INTERNAL MEDICINE

## 2017-02-01 PROCEDURE — 80048 BASIC METABOLIC PNL TOTAL CA: CPT | Performed by: INTERNAL MEDICINE

## 2017-02-01 PROCEDURE — 02HV33Z INSERTION OF INFUSION DEVICE INTO SUPERIOR VENA CAVA, PERCUTANEOUS APPROACH: ICD-10-PCS | Performed by: INTERNAL MEDICINE

## 2017-02-01 PROCEDURE — 82962 GLUCOSE BLOOD TEST: CPT

## 2017-02-01 RX ORDER — LIDOCAINE HYDROCHLORIDE 10 MG/ML
1-20 INJECTION INFILTRATION; PERINEURAL
Status: COMPLETED | OUTPATIENT
Start: 2017-02-01 | End: 2017-02-01

## 2017-02-01 RX ORDER — POTASSIUM CHLORIDE 7.45 MG/ML
10 INJECTION INTRAVENOUS
Status: COMPLETED | OUTPATIENT
Start: 2017-02-01 | End: 2017-02-02

## 2017-02-01 RX ORDER — HEPARIN SODIUM (PORCINE) LOCK FLUSH IV SOLN 100 UNIT/ML 100 UNIT/ML
500 SOLUTION INTRAVENOUS
Status: COMPLETED | OUTPATIENT
Start: 2017-02-01 | End: 2017-02-01

## 2017-02-01 RX ORDER — HEPARIN SODIUM 200 [USP'U]/100ML
500 INJECTION, SOLUTION INTRAVENOUS
Status: COMPLETED | OUTPATIENT
Start: 2017-02-01 | End: 2017-02-01

## 2017-02-01 RX ADMIN — SULFAMETHOXAZOLE AND TRIMETHOPRIM 1 TABLET: 800; 160 TABLET ORAL at 22:21

## 2017-02-01 RX ADMIN — PREDNISONE 10 MG: 10 TABLET ORAL at 16:21

## 2017-02-01 RX ADMIN — BRINZOLAMIDE 1 DROP: 10 SUSPENSION/ DROPS OPHTHALMIC at 11:49

## 2017-02-01 RX ADMIN — HEPARIN SODIUM 500 UNITS: 200 INJECTION, SOLUTION INTRAVENOUS at 12:42

## 2017-02-01 RX ADMIN — PILOCARPINE HYDROCHLORIDE 1 DROP: 10 SOLUTION/ DROPS OPHTHALMIC at 10:14

## 2017-02-01 RX ADMIN — BRINZOLAMIDE 1 DROP: 10 SUSPENSION/ DROPS OPHTHALMIC at 22:04

## 2017-02-01 RX ADMIN — BRINZOLAMIDE 1 DROP: 10 SUSPENSION/ DROPS OPHTHALMIC at 18:08

## 2017-02-01 RX ADMIN — LIDOCAINE HYDROCHLORIDE 1 ML: 10 INJECTION, SOLUTION INFILTRATION; PERINEURAL at 12:50

## 2017-02-01 RX ADMIN — SULFAMETHOXAZOLE AND TRIMETHOPRIM 1 TABLET: 800; 160 TABLET ORAL at 10:11

## 2017-02-01 RX ADMIN — DIFLUPREDNATE 1 DROP: 0.5 EMULSION OPHTHALMIC at 22:03

## 2017-02-01 RX ADMIN — HEPARIN SODIUM (PORCINE) LOCK FLUSH IV SOLN 100 UNIT/ML 500 UNITS: 100 SOLUTION at 12:55

## 2017-02-01 RX ADMIN — BRIMONIDINE TARTRATE 1 DROP: 1 SOLUTION/ DROPS OPHTHALMIC at 18:10

## 2017-02-01 RX ADMIN — Medication 1 DROP: at 13:34

## 2017-02-01 RX ADMIN — BRIMONIDINE TARTRATE 1 DROP: 1 SOLUTION/ DROPS OPHTHALMIC at 11:47

## 2017-02-01 RX ADMIN — LOTEPREDNOL ETABONATE 1 DROP: 5 SUSPENSION/ DROPS OPHTHALMIC at 13:45

## 2017-02-01 RX ADMIN — SODIUM CHLORIDE 1000 MG: 900 INJECTION, SOLUTION INTRAVENOUS at 16:19

## 2017-02-01 RX ADMIN — DIFLUPREDNATE 1 DROP: 0.5 EMULSION OPHTHALMIC at 16:00

## 2017-02-01 RX ADMIN — LEVOFLOXACIN 750 MG: 500 TABLET, FILM COATED ORAL at 16:21

## 2017-02-01 RX ADMIN — OLOPATADINE HYDROCHLORIDE 1 DROP: 2 SOLUTION/ DROPS OPHTHALMIC at 10:18

## 2017-02-01 RX ADMIN — ESCITALOPRAM OXALATE 10 MG: 10 TABLET ORAL at 10:10

## 2017-02-01 RX ADMIN — PREDNISONE 10 MG: 10 TABLET ORAL at 10:11

## 2017-02-01 RX ADMIN — SODIUM CHLORIDE 1 DROP: 50 SOLUTION OPHTHALMIC at 10:13

## 2017-02-01 RX ADMIN — MINERAL OIL 1 DROP: 2; 2 EMULSION OPHTHALMIC at 18:00

## 2017-02-01 RX ADMIN — FUROSEMIDE 20 MG: 10 INJECTION, SOLUTION INTRAMUSCULAR; INTRAVENOUS at 10:10

## 2017-02-01 RX ADMIN — Medication 10 ML: at 22:09

## 2017-02-01 RX ADMIN — BRIMONIDINE TARTRATE 1 DROP: 1 SOLUTION/ DROPS OPHTHALMIC at 22:04

## 2017-02-01 RX ADMIN — POTASSIUM CHLORIDE 10 MEQ: 10 INJECTION, SOLUTION INTRAVENOUS at 13:34

## 2017-02-01 RX ADMIN — BIMATOPROST 1 DROP: 0.1 SOLUTION/ DROPS OPHTHALMIC at 22:05

## 2017-02-01 RX ADMIN — DIFLUPREDNATE 1 DROP: 0.5 EMULSION OPHTHALMIC at 09:00

## 2017-02-01 RX ADMIN — POTASSIUM CHLORIDE 10 MEQ: 10 INJECTION, SOLUTION INTRAVENOUS at 18:06

## 2017-02-01 RX ADMIN — CARVEDILOL 12.5 MG: 12.5 TABLET, FILM COATED ORAL at 10:11

## 2017-02-01 RX ADMIN — CARVEDILOL 12.5 MG: 12.5 TABLET, FILM COATED ORAL at 16:21

## 2017-02-01 RX ADMIN — POTASSIUM CHLORIDE 10 MEQ: 10 INJECTION, SOLUTION INTRAVENOUS at 16:24

## 2017-02-01 RX ADMIN — MINERAL OIL 1 DROP: 2; 2 EMULSION OPHTHALMIC at 22:00

## 2017-02-01 RX ADMIN — SODIUM CHLORIDE 1 DROP: 50 SOLUTION OPHTHALMIC at 22:08

## 2017-02-01 RX ADMIN — ASPIRIN 81 MG: 81 TABLET, COATED ORAL at 10:10

## 2017-02-01 RX ADMIN — PILOCARPINE HYDROCHLORIDE 1 DROP: 10 SOLUTION/ DROPS OPHTHALMIC at 13:57

## 2017-02-01 RX ADMIN — Medication 10 ML: at 14:00

## 2017-02-01 RX ADMIN — POTASSIUM CHLORIDE 10 MEQ: 10 INJECTION, SOLUTION INTRAVENOUS at 16:00

## 2017-02-01 NOTE — ROUTINE PROCESS
Bedside and Verbal shift change report given to REJI Talavera RN  (oncoming nurse) by  REJI Buitrago RN  (offgoing nurse). Report included the following information SBAR, Kardex, Recent Results and Med Rec Status.

## 2017-02-01 NOTE — PROGRESS NOTES
Problem: Dysphagia (Adult)  Goal: *Acute Goals and Plan of Care (Insert Text)  Dysphagia Present: moderate  Aspiration: at risk     Recommendations:  Diet: full liquid honey-thick; ice chips PRN for comfort after oral care  Meds: crushed in puree  Aspiration Precautions - Oral Care TID  Other: 1:1 feeding assistance, safe swallow strategies    Goals: Patient will:  1. Tolerate PO trials with 0 s/s overt distress in 4/5 trials  2. Utilize compensatory swallow strategies/maneuvers (decrease bite/sip, size/rate, alt. liq/sol) with min cues in 4/5 trials  3. Perform oral-motor/laryngeal exercises to increase oropharyngeal swallow function with min cues  4. Complete an objective swallow study (i.e., MBSS) to assess swallow integrity, r/o aspiration, and determine of safest LRD, min A     Outcome: Not Progressing Towards Goal  SPEECH LANGUAGE PATHOLOGY DYSPHAGIA TREATMENT     Patient: Marie Eng (80 y.o. female)  Date: 2/1/2017  Diagnosis: Respiratory distress  Pneumonia  Sepsis ([facility] [address]  UTI (urinary tract infection)  COPD exacerbation ([facility]) Acute respiratory failure ([facility])       Precautions: aspiration Fall, DNR      ASSESSMENT:  Moderate oropharyngeal dysphagia in setting of debility and increasing weakness     Dysphagia f/u completed this PM with grandson at bedside. Diagnostic treatment completed with assessment of nectar-thick liquids via cup sips x5; noted delayed weak pharyngeal swallow and delayed cough response x2. Improved tolerance of honey-thick via tsp though continues with weak swallow response and delayed cough x1 noted. Educated pt and grandson re: aspiration risk, thickened liquids, strategies to maximize safety with PO intake and aspiration precautions. Recommend modify diet to full liquid honey-thick via tsp with ST to f/u for diet tolerance/advancement. Pt may benefit from modified barium swallow study to further assess pharyngeal function.       Progression toward goals:  [ ]         Improving appropriately and progressing toward goals  [ ]         Improving slowly and progressing toward goals  [X]         Not making progress toward goals and plan of care will be adjusted       PLAN: full liquid honey-thick  Recommendations and Planned Interventions:  ST to f/u for diet tolerance/advancement, education, MBS  Patient continues to benefit from skilled intervention to address the above impairments. Continue treatment per established plan of care. Discharge Recommendations:  [facility]       SUBJECTIVE:   Patient stated It's thick, it seems like it's going down easier (re: honey-thick liquids). OBJECTIVE:   Cognitive and Communication Status:  Neurologic State: Alert  Orientation Level: Oriented to place, Oriented to person  Cognition: Follows commands, Decreased attention/concentration  Perception: Appears intact  Perseveration: No perseveration noted  Safety/Judgement: Decreased insight into deficits  Dysphagia Treatment:  Oral Assessment:  Oral Assessment  Labial: Decreased rate  Dentition: Upper & lower dentures  Oral Hygiene:  (Good)  Lingual: Decreased rate  Velum: No impairment  Mandible: No impairment  Gag Reflex:  (Not elicited)  P.O.  Trials:              Patient Position: HOB 30              Vocal quality prior to P.O.: No impairment              Consistency Presented: Nectar thick liquid, Honey thick liquid              How Presented: SLP-fed/presented, Cup/sip, Spoon              How Much:  (x5 each)              Bolus Acceptance: No impairment              Bolus Formation/Control: Impaired              Type of Impairment: Delayed              Propulsion: Delayed (# of seconds)              Oral Residue: None              Initiation of Swallow: Delayed (# of seconds)              Laryngeal Elevation: Decreased, Weak              Aspiration Signs/Symptoms: Delayed cough/throat clear, Infiltrate on chest xray              Pharyngeal Phase Characteristics: Double swallow, Poor endurance, Suspected pharyngeal residue              Effective Modifications: Small sips and bites              Cues for Modifications: Minimal                                Oral Phase Severity: Moderate              Pharyngeal Phase Severity : Moderate                                                                                                                                                                                                                                                                                                                                                                                                                                                                                                                                                                                                                                                                                                                                                                                                                                                                                               PAIN:  Start of Tx: 0  End of Tx: 0      After treatment:   [ ]              Patient left in no apparent distress sitting up in chair  [X]              Patient left in no apparent distress in bed  [X]              Call bell left within reach  [ ]              Nursing notified  [X]              Family present  [ ]              Caregiver present  [ ]              Bed alarm activated         COMMUNICATION/EDUCATION:   [X]        Aspiration precautions; swallow safety; compensatory techniques  [ ]        Patient unable to participate in education; education ongoing with staff  [X]        Posted safety precautions in patient's room.   [ ]         Oral-motor/laryngeal strengthening exercises        PEGGY Granger  Time Calculation: 17 mins

## 2017-02-01 NOTE — PROGRESS NOTES
CM was informed by Dr. Nilton Jefferson that the discharge plan is for pt to return to [facility] tomorrow under hospice care with [facility]. CM notified [phone] of this plan and called and spoke with Paz at [facility] to notify her of this plan. CM notified CMS of the above. CM will coordinate stretcher transport time with facility and hospice tomorrow. Eric Vargas from [facility] called and CM reviewed the above plan with him.

## 2017-02-01 NOTE — ROUTINE PROCESS
Bedside and Verbal shift change report given to ANGI [facility] (oncoming nurse) by Elzbieta Ellsworth RN (offgoing nurse). Report included the following information SBAR, Kardex, Procedure Summary, Intake/Output, MAR and Recent Results.

## 2017-02-01 NOTE — ROUTINE PROCESS
Echocardiogram attempted. Speech Therapy just began examination. Advised will attempt at later time.

## 2017-02-01 NOTE — PROGRESS NOTES
Shift Summary :  A usual night without significant events. Blood pressure elevated as she made concerns about her right arm sore, need for repositioning, or yelling for nurse.   Patient Vitals for the past 12 hrs:   Temp Pulse Resp BP SpO2   02/01/17 0409 97.6 °F (36.4 °C) 65 18 183/58 96 %   02/01/17 0135 97.8 °F (36.6 °C) (!) 59 18 196/85 98 %   01/31/17 2038 98.6 °F (37 °C) 66 16 145/70 100 %

## 2017-02-01 NOTE — PROGRESS NOTES
PT Pamela Smith, 81 yo female. Pt in bed resting during bedside report. Pt has no report of pain, N/V  At this time. Pt transported for PICC line placement via stretcher. Pt returned to her room, she is resting comfortably with family members at her bedside. PICC line utilized for IV medications. Pt pleasant during the shift. She is resting comfortably with her grandson at the bedside.

## 2017-02-01 NOTE — PROGRESS NOTES
Palliative Medicine Progress Note  [facility]: [phone]  [facility]: [phone]   [facility]: [phone]    Patient Name: Roxi Alonzo  YOB: 1921    Date of Initial Consult: 1/30/17   Reason for Consult: Care Decisions  Requesting Provider: Elin Naidu MD   Primary Care Physician: Fabiana Robins MD      SUMMARY:   Roxi Alonzo is a 80y.o. year old with a past history of DM, HTN, COPD, Afib, who was admitted on 1/25/2017 from [facility]  with a diagnosis of:    COPD with exacerbation  Sepsis  Acute respiratory failure  MRSA bacteremia  HCAP pneumonia  Chronic a fib  Long QT interval  DM  HTN  A-fib  Third degree block  UTI        Current medical issues leading to Palliative Medicine involvement include: Care Decisions in setting of advanced age with little improvement with treatment and patient's desire to return home. Presented with increasing cough, shortness of breath, and was not responding to outpatient antimicrobial therapy given at [facility]. She is chronically on oxygen, but she was saturating in the 80% PTA. Blood cultures here demonstrated MRSA. Followup blood cultures are sterile. The patient has been treated with broad-spectrum antimicrobial therapy and states and she feels no better. At baseline, can stand and transfer to a chair, but she is otherwise not ambulatory. Has been at [facility] for about 6 years. Dr. Leane Lesch discussed a change in [address] with daughter as there has been little improvement noted with patient repeatedly stating she does not wish to be here and wants to go home. Daughter states her mother in the past has always told her that she \"doesn't want to die\" and always seems to bounce back. She was \"near death\" about 2 years ago and recovered. She has had no hospitalizations in the past 1-2 years. She last walked about 2 years ago.  Daughter aware that benefits of treatment will begin to decline and believes this is what may be happening now. Daughter wishes for her mother to make decision about [address].     1/31/17: Overnight patient's grandson decided it was essential for her to receive IVF and daughter asked for nursing to reinitiate. Became confused overnight. Somewhat confused this am, denies complaint. 2/1/17: Alert, complains of upset stomach and loose stool and asks \"why can't I go home? \" No family at bedside. PALLIATIVE DIAGNOSES:   1. Acute respiratory failure  2. Pneumonia  3. Dyspnea  4. Debility   5. Comfort Care       PLAN:   1. Prior to discussing with patient, we spoke with daughter [place] and son in law and clarified the difference between aggressive care and comfort plan and what each would mean. Daughter also voiced concerns that she believes her mother has a fear of dying. She indicated that her mother worries about many things and can become agitated. Daughter felt her mother was able to make her own decision. Palliative Medicine team Liban Ha MD, [facility] NP)  met with patient at bedside. She is alert and has a relatively good understanding of her current medical condition. She was asked by us and separately by her daughter her preference for continuation of aggressive care with continued hospitalization and IV antibiotics versus a choice to return to her home at [facility] with oral antibiotics with reduced chance that she will improve. She agreed with plan to return to [facility] with comfort as goal and to receive oral antibiotics with the understanding this is less aggressive treatment and may result in a more rapid decline. She indicated that she knows at some point she will die at her age of 80, almost 80 and accepts this. Her hope is to be more comfortable and she stated \"I can't stay here\" as it was too uncomfortable for her. Plan is to begin comfort care here and continue eye drops and oral antibiotics and return to [facility] with hospice Maritza Maier?). Comfort Care orders placed.  Will defer to Attending, Dr. Dru Morrison, to change to oral antibiotics as discussed. 17: Call made to daughter Kia Denton who confirms that despite her mother's stated wishes over the weekend and confirmed in our meeting yesterday, they would now like to pursue full aggressive measures to include IVF and complete standard course of IV abx for her sepsis and pneumonia. Discussed with Michelle Bruce and Dr. Trav Yeager. 17: Will almost certainly fair poorly from extended hospital stay and long term parenteral abx. Will sign off. Feel free to reconsult should family reconsider goals of care. Psychosocial/Functional status:  in . Worked as a   3 children--one . Has always relied on daughter Kia Denton more that other daughter who lives in [place] and moved here 20 years ago to be closer  Resides at [facility] for past 6 years. Active socially at [facility].      2. Advance Care Planning: Has AD per daughter who states both she and her spouse are listed as MPOAs. She will provide a copy. Code Status: DNR    Durable DNR status: Has existing DDNR. 4. Symptoms: none    5. Disposition: TBD but will likely return to [facility]. 6. Initial consult note routed to primary continuity provider. 7. Communicated plan of care with: Palliative IDT, patient, family, nurse, Dr. Dru Morrison.        GOALS OF CARE:     [====Goals of Care====]  GOALS OF CARE:  Patient / health care proxy stated goals:Plan has changed from comfort to aggressive/curative    TREATMENT PREFERENCES:   Code Status: DNR    Advance Care Planning:  Advance Care Planning 2017   Patient's Healthcare Decision Maker is: Legal Next of Kin   Primary Decision Maker Name Terrie Cano   Primary Decision Maker Phone Number [phone]   Primary Decision Maker Relationship to Patient Adult child   Secondary Decision Maker Name Kingston Villarreal    Secondary Decision Maker Relationship to Patient Adult child   Confirm Advance Directive -   Does the patient have other document types Do Not Resuscitate       Other:    The palliative care team has discussed with patient / health care proxy about goals of care / treatment preferences for patient.  [====Goals of Care====]      Advance Care Planning 1/31/2017   Patient's Healthcare Decision Maker is: Legal Next of Leia Montes   Primary Decision Maker Name [facility]   Primary Decision Maker Phone Number [phone]   Primary Decision Maker Relationship to Patient Adult child   Secondary Decision Maker Name [facility]    Secondary Decision Maker Relationship to Patient Adult child   Confirm Advance Directive -   Does the patient have other document types Do Not Resuscitate      [facility] [phone]     HISTORY:     History obtained from: chart, patient, daughter    CHIEF COMPLAINT: SOB    HPI/SUBJECTIVE:    The patient is:   [x] Verbal and participatory  [] Non-participatory due to:      SEE SUMMARY    Clinical Pain Assessment (nonverbal scale for nonverbal patients): Pain: 0         FUNCTIONAL ASSESSMENT:     Palliative Performance Scale (PPS):  PPS: 40       PSYCHOSOCIAL/SPIRITUAL SCREENING:     Advance Care Planning:  Advance Care Planning 1/31/2017   Patient's Healthcare Decision Maker is: Legal Next of Leia Montes   Primary Decision Maker Name [facility]   Primary Decision Maker Phone Number [phone]   Primary Decision Maker Relationship to Patient Adult child   Secondary Decision Maker Name [facility]    Secondary Decision Maker Relationship to Patient Adult child   Confirm Advance Directive -   Does the patient have other document types Do Not Resuscitate        Any spiritual / Muslim concerns:  [x] Yes /  [] No  Daughter identified a 'fear of dying'   Will contact  for assistance.       Caregiver Burnout:  [] Yes /  [x] No /  [] No Caregiver Present      Anticipatory grief assessment:   [x] Normal  / [] Maladaptive       ESAS Anxiety: Anxiety: 4     ESAS Depression:   not addressed at this visit       REVIEW OF SYSTEMS:     Positive and pertinent negative findings in ROS are noted above in HPI. The following systems were [x] reviewed / [] unable to be reviewed as noted in HPI   Other findings are noted below. Systems: constitutional, ears/nose/mouth/throat, respiratory, gastrointestinal, genitourinary, musculoskeletal, integumentary, neurologic, psychiatric, endocrine. Positive findings noted below. Modified ESAS Completed by: provider           Pain: 0   Anxiety: 4     Anorexia: 2 Dyspnea: 0           Stool Occurrence(s): 1        PHYSICAL EXAM:     Wt Readings from Last 3 Encounters:   02/01/17 69.4 kg (153 lb 1.6 oz)   08/26/15 81.1 kg (178 lb 12.8 oz)   05/14/15 73.9 kg (163 lb)     Blood pressure 140/74, pulse 71, temperature 97.4 °F (36.3 °C), resp. rate 18, height 5' 6\" (1.676 m), weight 69.4 kg (153 lb 1.6 oz), SpO2 93 %. Pain:  Pain Scale 1: Numeric (0 - 10)  Pain Intensity 1: 0     Pain Location 1: Leg  Pain Orientation 1: Right  Pain Description 1: Sore  Pain Intervention(s) 1: Medication (see MAR)  Last bowel movement:     Constitutional: NAD. Alert and pleasant.   Eyes: pupils equal, anicteric  ENMT: no nasal discharge, moist mucous membranes  Cardiovascular: regular rhythm, no pedal edema  Respiratory: breathing not labored, symmetric  Gastrointestinal: soft non-tender, +bowel sounds  Musculoskeletal: no deformity, no tenderness to palpation  Skin: warm, dry  Neurologic: following commands, moving all extremities  Psychiatric: full affect, no hallucinations  Other:       HISTORY:     Principal Problem:    Acute respiratory failure ([address] (1/25/2017)    Active Problems:    Hypertension ()      DM (diabetes mellitus) ([facility] (5/21/2012)      A-fib ([facility] (6/24/2012)      Third degree heart block ([address] (5/9/2015)      COPD with acute exacerbation ([address] (8/21/2015)      UTI (urinary tract infection) (1/25/2017)      Sepsis ([address] (1/25/2017)      MRSA bacteremia (1/27/2017)      HCAP (healthcare-associated pneumonia) (1/27/2017)      Long QT interval (1/27/2017)      Past Medical History   Diagnosis Date    Adhesive capsulitis     Anemia     Anxiety     Arthritis     Asthma     Atrial fibrillation (HCC)     CAD (coronary artery disease)     COPD     Diabetes (HCC)     Diverticulosis     GERD (gastroesophageal reflux disease)     Hypertension     Osteoporosis     Peripheral neuropathy [facility]     Psychiatric disorder      depression    Thromboembolus ([facility]     Unspecified sleep apnea      no longer wears CPAP. Past Surgical History   Procedure Laterality Date    Hx colectomy      Hx appendectomy      Hx gyn       hysterectomy    Hx heent       tonsillectomy    Hx hemorrhoidectomy      Hx corneal transplant        Family History   Problem Relation Age of Onset    Heart Disease Mother     Cancer Father     Cancer Sister      History reviewed, no pertinent family history.   Social History   Substance Use Topics    Smoking status: Never Smoker    Smokeless tobacco: Never Used    Alcohol use No     Allergies   Allergen Reactions    Latex Rash    Codeine Other (comments)     Pt unable to answer reaction      Current Facility-Administered Medications   Medication Dose Route Frequency    light mineral oil-min oil (PF) 0.5-0.5 % dpet 1 Drop (Retaine Ophth Drops)  1 Drop Ophthalmic QID    lidocaine (XYLOCAINE) 10 mg/mL (1 %) injection 1-20 mL  1-20 mL SubCUTAneous RAD ONCE    heparin (porcine) 100 unit/mL injection 500 Units  500 Units InterCATHeter RAD ONCE    heparinized saline 2 units/mL infusion 1,000 Units  500 mL Irrigation RAD ONCE    furosemide (LASIX) injection 20 mg  20 mg IntraVENous BID    Vancomycin - Pharmacokinetic Dosing  1 Each Other Rx Dosing/Monitoring    vancomycin (VANCOCIN) 1,000 mg in 0.9% sodium chloride (MBP/ADV) 250 mL adv  1,000 mg IntraVENous Q24H    levoFLOXacin (LEVAQUIN) tablet 750 mg  750 mg Oral Q24H    trimethoprim-sulfamethoxazole (BACTRIM DS, SEPTRA DS) 160-800 mg per tablet 1 Tab  1 Tab Oral Q12H    azithromycin (AZASITE) 1 % ophthalmic solution 1 Drop  1 Drop Left Eye QHS    haloperidol (HALDOL) 2 mg/mL oral solution 2 mg  2 mg Oral Q6H PRN    promethazine (PHENERGAN) suppository 25 mg  25 mg Rectal Q6H PRN    oxyCODONE (ROXICODONE INTENSOL) 20 mg/mL concentrated solution 5 mg  5 mg Oral Q2H PRN    predniSONE (DELTASONE) tablet 10 mg  10 mg Oral BID WITH MEALS    loteprednol etabonate (LOTEMAX) 0.5 % ophthalmic suspension 1 Drop  1 Drop Right Eye DAILY    sodium chloride (NS) flush 5-10 mL  5-10 mL IntraVENous Q8H    sodium chloride (NS) flush 5-10 mL  5-10 mL IntraVENous PRN    albuterol-ipratropium (DUO-NEB) 2.5 MG-0.5 MG/3 ML  3 mL Nebulization Q4H PRN    aspirin delayed-release tablet 81 mg  81 mg Oral DAILY    bimatoprost (LUMIGAN) 0.01 % ophthalmic drops 1 Drop  1 Drop Right Eye QHS    brimonidine (ALPHAGAN P) 0.1 % ophthalmic solution 1 Drop  1 Drop Both Eyes TID    brinzolamide (AZOPT) 1 % ophthalmic suspension 1 Drop  1 Drop Both Eyes TID    carvedilol (COREG) tablet 12.5 mg  12.5 mg Oral BID WITH MEALS    Difluprednate (DUREZOL) 0.05 % ophthalmic emulsion 1 Drop  1 Drop Right Eye Q7D    Difluprednate (DUREZOL) 0.05 % ophthalmic emulsion 1 Drop  1 Drop Left Eye TID    escitalopram oxalate (LEXAPRO) tablet 10 mg  10 mg Oral DAILY    pilocarpine (PILOCAR) 1 % ophthalmic solution 1 Drop  1 Drop Right Eye Q6H    olopatadine (PATADAY) ophthalmic solution 0.2 % -  1 Drop  1 Drop Both Eyes DAILY    sodium chloride (MAHESH-5) 5 % ophthalmic solution 1 Drop  1 Drop Left Eye BID    acetaminophen (TYLENOL) tablet 650 mg  650 mg Oral Q6H PRN        LAB AND IMAGING FINDINGS:     Lab Results   Component Value Date/Time    WBC 9.4 01/30/2017 06:16 AM    HGB 10.2 01/30/2017 06:16 AM    PLATELET 749 71/00/6286 06:16 AM     Lab Results   Component Value Date/Time    Sodium 143 02/01/2017 05:17 AM    Potassium 3.0 02/01/2017 05:17 AM    Chloride 104 02/01/2017 05:17 AM    CO2 32 02/01/2017 05:17 AM    BUN 27 02/01/2017 05:17 AM    Creatinine 0.97 02/01/2017 05:17 AM    Calcium 8.4 02/01/2017 05:17 AM    Magnesium 1.9 01/30/2017 06:16 AM    Phosphorus 2.9 01/30/2017 06:16 AM      Lab Results   Component Value Date/Time    AST 16 01/30/2017 06:16 AM    Alk.  phosphatase 38 01/30/2017 06:16 AM    Protein, total 5.2 01/30/2017 06:16 AM    Albumin 2.3 01/30/2017 06:16 AM    Globulin 2.9 01/30/2017 06:16 AM     Lab Results   Component Value Date/Time    INR 1.3 01/25/2017 09:40 AM    Prothrombin time 15.9 01/25/2017 09:40 AM    aPTT 20.3 01/25/2017 09:40 AM      No results found for: IRON, FE, TIBC, IBCT, PSAT, FERR   No results found for: PH, PCO2, PO2  No components found for: [place]   Lab Results   Component Value Date/Time    CK 40 01/25/2017 09:40 AM    CK - MB <0.5 01/25/2017 09:40 AM              Total time: 15  Counseling / coordination time: 12  > 50% counseling / coordination      Jeni Oh MD  Palliative Medicine

## 2017-02-01 NOTE — PROGRESS NOTES
Hospitalist Progress Note    Patient: Phoenix Obrien MRN: [id number]  CSN: [id number]    YOB: 1921  Age: 80 y.o. Sex: female    DOA: 1/25/2017 LOS:  LOS: 7 days          Chief Complaint: Ac resp failure      Assessment/Plan     Acute respiratory failure 2/2 pneumonia. Sepsis 2/2 Pneumonia and UTI and bactermia  E coli UTI-changed to PO bactrim  MRSA bacteremia. Cx dated 1/25/17. Has picc now  Hypernatremia/hypokalemia-replete K IV X 4 runs  Ac diastolic CHF-improved by recent CXR-avoid aggressive IVF-her mouth is dry from mouth breathing and her discomfort and CHF will be exacerbated by IVF as I have explained to the family  Protein malnutrition moderate severity poa. Anemia normochromic normocytic. Glaucoma. She has a very intense regimen of eye drops confirmed by daughter-basically blind at this juncture  Hx ppm placement for 3rd degree block. Dyslipidemia. DM.    she is near the end of her life  We will NOT continue the IV abx as desired earlier by her daughter as she now does not want those and we have reviewed all of this again and she feels comfortable with the plan. ..bas the picc line now which we will leave in place for now for possible comfort treatments as needed and hospice will address for removal at [facility]  We all agree that IVF hydration will likely worsen her clinical sx and exacerbate SOB and CHF and thus focussing on comfort sx mgmt is mot simportant  she is a DNR  D/c planning to rehab with hospice-I have confirmed this plan with Tong Prachi, her daughter, and Tej Lopez from [facility] hospice today at 2 pm-everyone is on the same page-we are NOT doing IV abx but will leave picc for possible comforting tx as needed since she just had it placed and arrange d/c back to NH by tomorrow Am  Wean 02 as tolerated    Total time: 50 minutes coordinating above  Counseling / coordination time:   > 50% counseling / coordination      Patient Active Problem List   Diagnosis Code    Hypertension I10    DM (diabetes mellitus) ([facility] E11.9    [facility]-fib ([facility] I48.91    Third degree heart block (HCC) I44.2    COPD with acute exacerbation (HCC) J44.1    UTI (urinary tract infection) N39.0    Sepsis (HCC) A41.9    Acute respiratory failure (HCC) J96.00    MRSA bacteremia R78.81    HCAP (healthcare-associated pneumonia) J18.9    Long QT interval R94.31       Subjective:    My mouth is so dry  Denies CP, SOB, had a prod cough with brown phlegm this am    Review of systems:    Constitutional: denies fevers, chills, myalgias  Cardiovascular: denies chest pain, palpitations  Gastrointestinal: denies nausea, vomiting, diarrhea      Vital signs/Intake and Output:  Visit Vitals    /74    Pulse 71    Temp 97.4 °F (36.3 °C)    Resp 18    Ht 5' 6\" (1.676 m)    Wt 69.4 kg (153 lb 1.6 oz)    SpO2 93%    BMI 24.71 kg/m2     Current Shift:     Last three shifts:  01/30 1901 - 02/01 0700  In: 925 [I.V.:925]  Out: -     Exam:    General: elderly severely debilitated and frail, bruised WF, alert, oriented to person and place  Dry mouth and tongue  Head/Neck: NCAT, supple, No masses, No lymphadenopathy  CVS:irreg rhythm, reg rate, no M/R/G, S1/S2 heard, no thrill  Lungs:Coarse BS, no wheezes  Abdomen: Soft, Nontender, No distention, Normal Bowel sounds, No hepatomegaly  Extremities: No C/C/E, pulses palpable 2+  Skin:multiple bruises  Neuro:grossly normal , follows commands  Psych:appropriate                Labs: Results:       Chemistry Recent Labs      02/01/17   0517  01/30/17   0616   GLU  112*  120*   NA  143  149*   K  3.0*  3.3*   CL  104  113*   CO2  32  26   BUN  27*  40*   CREA  0.97  1.08   CA  8.4*  8.6   AGAP  7  10   BUCR  28*  37*   AP   --   38*   TP   --   5.2*   ALB   --   2.3*   GLOB   --   2.9   AGRAT   --   0.8      CBC w/Diff Recent Labs      01/30/17   0616   WBC  9.4   RBC  3.46*   HGB  10.2*   HCT  32.8*   PLT  236   GRANS  72   LYMPH  18*   EOS  0      Cardiac Enzymes No results for input(s): CPK, CKND1, LILIANA in the last 72 hours. No lab exists for component: CKRMB, TROIP   Coagulation No results for input(s): PTP, INR, APTT in the last 72 hours. No lab exists for component: INREXT    Lipid Panel Lab Results   Component Value Date/Time    Cholesterol, total 147 06/24/2012 02:15 AM    HDL Cholesterol 46 06/24/2012 02:15 AM    LDL, calculated 78.8 06/24/2012 02:15 AM    VLDL, calculated 22.2 06/24/2012 02:15 AM    Triglyceride 111 06/24/2012 02:15 AM    CHOL/HDL Ratio 3.2 06/24/2012 02:15 AM      BNP No results for input(s): BNPP in the last 72 hours.    Liver Enzymes Recent Labs      01/30/17   0616   TP  5.2*   ALB  2.3*   AP  38*   SGOT  16      Thyroid Studies Lab Results   Component Value Date/Time    TSH 0.16 01/29/2017 01:52 AM        Procedures/imaging: see electronic medical records for all procedures/Xrays and details which were not copied into this note but were reviewed prior to creation of Dion Arreola MD

## 2017-02-01 NOTE — ROUTINE PROCESS
Bedside and Verbal shift change report given to MATTHEW Gonzalez (oncoming nurse) by Sanya Liu RN (offgoing nurse). Report included the following information SBAR, Kardex, Intake/Output, MAR, Accordion and Med Rec Status.

## 2017-02-01 NOTE — PROGRESS NOTES
ID Progress Note    Current antibiotics: Vancomycin (Day 7) Levofloxacin (Day 8) TMP/SMX (Day 2) - Day 7 antibiotics directed against E coli    CC: 80 y.o. Female admitted on 1/25/17 with acute respiratory failure. Subjective: Patient reports continued cough and mild SOB. Denies diarrhea or IV site tenderness. No rash. PE:  Tmax=98.6 DP=780/79 R=18 P=59  Alert, NAD. HEENT: Anicteric. Mouth w/o thrush. Neck: Supple. Lungs: decreased BS at bases bilaterally. CV: IRRR. Abdomen: Soft. Non tender. Ext: scattered ecchymoses. No cellulitis. Labs/Radiology:  WBC=9.4 Mxxj=104I Cr=0.97  Blood culture (1/25/17) - MRSA                        (1/26/17) - NG                        (1/29/17) - NGSF. Urine culture - (1/26/17) - E coli   CXR (1/29/1&0 - RLL pneumonia. ASSESSMENT/RECOMMENDATIONS    1. MRSA bacteremia - low grade but cannot regard as acquisition contaminant. Will treat as S aureus bacteremia w/o a removable focus - 28 days of IV vancomycin unless echo positive in which case she would need 42 days. Ok to place PICC from ID standpoint. Will continue vancomycin. Echocardiogram.    2. Pneumonia. Continues on levofloxacin - plan on 10 day course. 3. E coli UTI      Continue TMP/SMX for another 3 days. 4. Medical problems including A fibrillation, DM, CAD, COPD.

## 2017-02-02 VITALS
OXYGEN SATURATION: 95 % | SYSTOLIC BLOOD PRESSURE: 145 MMHG | DIASTOLIC BLOOD PRESSURE: 87 MMHG | HEIGHT: 66 IN | BODY MASS INDEX: 24.6 KG/M2 | RESPIRATION RATE: 16 BRPM | TEMPERATURE: 97.9 F | WEIGHT: 153.1 LBS | HEART RATE: 72 BPM

## 2017-02-02 LAB
ANION GAP BLD CALC-SCNC: 11 MMOL/L (ref 3–18)
BUN SERPL-MCNC: 32 MG/DL (ref 7–18)
BUN/CREAT SERPL: 28 (ref 12–20)
CALCIUM SERPL-MCNC: 8.1 MG/DL (ref 8.5–10.1)
CHLORIDE SERPL-SCNC: 101 MMOL/L (ref 100–108)
CO2 SERPL-SCNC: 30 MMOL/L (ref 21–32)
CREAT SERPL-MCNC: 1.14 MG/DL (ref 0.6–1.3)
GLUCOSE BLD STRIP.AUTO-MCNC: 111 MG/DL (ref 70–110)
GLUCOSE BLD STRIP.AUTO-MCNC: 98 MG/DL (ref 70–110)
GLUCOSE SERPL-MCNC: 95 MG/DL (ref 74–99)
POTASSIUM SERPL-SCNC: 3.7 MMOL/L (ref 3.5–5.5)
SODIUM SERPL-SCNC: 142 MMOL/L (ref 136–145)

## 2017-02-02 PROCEDURE — 82962 GLUCOSE BLOOD TEST: CPT

## 2017-02-02 PROCEDURE — 36415 COLL VENOUS BLD VENIPUNCTURE: CPT | Performed by: INTERNAL MEDICINE

## 2017-02-02 PROCEDURE — 74011636637 HC RX REV CODE- 636/637: Performed by: INTERNAL MEDICINE

## 2017-02-02 PROCEDURE — 74011250637 HC RX REV CODE- 250/637: Performed by: HOSPITALIST

## 2017-02-02 PROCEDURE — 36592 COLLECT BLOOD FROM PICC: CPT

## 2017-02-02 PROCEDURE — 74011250637 HC RX REV CODE- 250/637: Performed by: NURSE PRACTITIONER

## 2017-02-02 PROCEDURE — 74011000250 HC RX REV CODE- 250: Performed by: HOSPITALIST

## 2017-02-02 PROCEDURE — 80048 BASIC METABOLIC PNL TOTAL CA: CPT | Performed by: INTERNAL MEDICINE

## 2017-02-02 PROCEDURE — 77010033678 HC OXYGEN DAILY

## 2017-02-02 RX ORDER — LEVOFLOXACIN 750 MG/1
750 TABLET ORAL EVERY 24 HOURS
Qty: 3 TAB | Refills: 0 | Status: SHIPPED
Start: 2017-02-02 | End: 2017-02-05

## 2017-02-02 RX ORDER — LOTEPREDNOL ETABONATE 5 MG/ML
1 SUSPENSION/ DROPS OPHTHALMIC DAILY
Qty: 5 ML | Refills: 0 | Status: SHIPPED
Start: 2017-02-02

## 2017-02-02 RX ORDER — OXYCODONE HCL 20 MG/ML
5 CONCENTRATE, ORAL ORAL
Qty: 30 ML | Refills: 0 | Status: SHIPPED | OUTPATIENT
Start: 2017-02-02

## 2017-02-02 RX ORDER — SULFAMETHOXAZOLE AND TRIMETHOPRIM 800; 160 MG/1; MG/1
1 TABLET ORAL EVERY 12 HOURS
Qty: 10 TAB | Refills: 0 | Status: SHIPPED
Start: 2017-02-02 | End: 2017-02-07

## 2017-02-02 RX ADMIN — BRINZOLAMIDE 1 DROP: 10 SUSPENSION/ DROPS OPHTHALMIC at 09:11

## 2017-02-02 RX ADMIN — CARVEDILOL 12.5 MG: 12.5 TABLET, FILM COATED ORAL at 08:48

## 2017-02-02 RX ADMIN — DIFLUPREDNATE 1 DROP: 0.5 EMULSION OPHTHALMIC at 08:52

## 2017-02-02 RX ADMIN — ASPIRIN 81 MG: 81 TABLET, COATED ORAL at 09:09

## 2017-02-02 RX ADMIN — Medication 10 ML: at 07:00

## 2017-02-02 RX ADMIN — SULFAMETHOXAZOLE AND TRIMETHOPRIM 1 TABLET: 800; 160 TABLET ORAL at 09:09

## 2017-02-02 RX ADMIN — BRIMONIDINE TARTRATE 1 DROP: 1 SOLUTION/ DROPS OPHTHALMIC at 08:49

## 2017-02-02 RX ADMIN — MINERAL OIL 1 DROP: 2; 2 EMULSION OPHTHALMIC at 09:00

## 2017-02-02 RX ADMIN — OXYCODONE HYDROCHLORIDE 5 MG: 100 SOLUTION ORAL at 12:03

## 2017-02-02 RX ADMIN — PREDNISONE 10 MG: 10 TABLET ORAL at 08:48

## 2017-02-02 RX ADMIN — LOTEPREDNOL ETABONATE 1 DROP: 5 SUSPENSION/ DROPS OPHTHALMIC at 08:53

## 2017-02-02 RX ADMIN — ESCITALOPRAM OXALATE 10 MG: 10 TABLET ORAL at 09:09

## 2017-02-02 RX ADMIN — MINERAL OIL 1 DROP: 2; 2 EMULSION OPHTHALMIC at 13:00

## 2017-02-02 RX ADMIN — SODIUM CHLORIDE 1 DROP: 50 SOLUTION OPHTHALMIC at 08:54

## 2017-02-02 NOTE — DISCHARGE SUMMARY
[address] [place] [address]  TRANSFER SUMMARY    Name:  Karl Pollack  MR#:  [id number]  :  1921  Account #:  [de-identified]  Date of Adm:  2017  Date of Transfer:      DIAGNOSES AT DISCHARGE  1. Acute respiratory failure with pneumonia. 2. Sepsis due to pneumonia, urinary tract infection and methicillin-  resistant staphylococcus aureus bacteremia. 3. Escherichia coli urinary tract infection. 4. Methicillin-resistant Staphylococcus aureus bacteremia. 5. Hypernatremia and hypokalemia. 6. Acute diastolic congestive heart failure. 7. Severe debility and weakness. 8. Glaucoma, severe. 9. History of pacemaker placement. 10. Diabetes. 11. Dyslipidemia. CODE STATUS: DO NOT RESUSCITATE. CONSULTANTS: Infectious Disease. HOSPITAL SUMMARY: The patient is 80years old. She came from  the [facility] where she is a resident. She  was admitted on 2017. She came in with shortness of breath,  increased work of breathing. She was saturating in the 80% range with  hypoxia and there was evidence on chest x-ray of a right lower lobe  pneumonia. She has a history of severe eye disease on a multitude of  eye drops prescribed by a local ophthalmologist. She has a history of  anxiety, diverticulosis, GERD, osteoporosis, COPD. She is chronically  on oxygen. She also has a previous history of sleep apnea, but no  longer wears CPAP per her request. She is a debilitated lady who is  not demented. Her family is very engaged with her. Her primary point of  contact is Bridger Dave, her daughter, who was in the emergency room with her. A code status of DO NOT RESUSCITATE was enacted here per  discussion with the patient and her daughter, which she had  previously. We continued antibiotic therapy and treatment for sepsis  and respiratory failure when she came into the hospital. She was  consulted upon by Infectious Disease for evidence for bacteremia,  which turned out to be MRSA. She has continued on treatment for an  E coli UTI and the pneumonia and she has slowly been improving to  where her respiratory status is now stable on nasal cannula oxygen  and she is not requiring regular nebulizers and she is more  comfortable from that standpoint. Multiple discussions were held about  a possible transition into comfort care. At first, the daughter was  reluctant to release intravenous fluids and there was concern about  continuing those are not, but after further discussion with myself and  the daughter, we recommended that intravenous fluids may only serve  to cause heart failure symptoms and worsening uncomfortable  symptoms for her at this stage and near the end of her life and she  was very much in agreement that should not be continued. She had a  chest x-ray on 02/01/2017 which showed congestive failure with right  lower lobe consolidation improved and that congestive failure was  compared to the 29th, which was actually improved after we gave her  diuresis and cut down the IV fluids. She continues to have a dry mouth,  which needs local care, but that is really primarily because she is  mouth breathing at this time. The patient today is awake and alert. She  is oriented x3. She is, however, almost completely blind and requires a  multitude of eyedrops on a regimen that is pretty strict to preserve the  remaining very small portion of any vision she has left. Her  temperature is 98.6, pulse 64, blood pressure 176/89, respiratory rate  16. She is saturating 97% on 4 liters nasal cannula. Her lungs are clear  anteriorly. Cardiac exam is regular rate and rhythm. Her abdomen is  soft, nontender and lower extremities no edema. She has multiple  bruises and very fragile skin noted all over. Her last metabolic profile  was unremarkable today with normal potassium, BUN and creatinine  32 and 1.14. She did have acute kidney injury upon admission to the  hospital, but that appears to have resolved. Her last hemoglobin and  hematocrit were 10.2 and 32.8. Her last white count was 9.4, which  was normal, as well as her platelets which was on 01/30/2017. Her  repeat blood cultures after admission have cleared, and Infectious  Disease was going to recommend a course of vancomycin, but after  discussion with her daughter yesterday, she would prefer she received  no further intravenous antibiotics and they are going to have hospice at  [facility] through [facility]. So yesterday, I spent  time on the son with her daughter Cooper Chauhan. We confirmed a plan for  hospice, but finishing oral antibiotics, which is what she would like to  do for the UTI and pneumonia, but not proceeding with any further  intravenous antibiotics. She will be under hospice care at the facility  and I spoke with Aguila Flowers who is with [facility] and he  confirms that we can leave the PICC line in case there is any  comforting medications that should be given through that, but in the  interim, we plan on a hospice conversion for the patient. DISCHARGE PLAN:  Plan on discharging today to the facility. DIET: Currently is full liquid with honey thickened liquids and aspiration  risk. MEDICATIONS FOR DISCHARGE  1. Levaquin 750 mg daily for 3 more days. 2. Roxicodone solution 5 mg every 2 hours as needed for moderate or  severe pain. 3. Tafluprost 1 drop left eye daily. 4. Bactrim-DS 1 tab twice daily for 5 days. 5. DuoNeb every 4 hours as needed for wheezing. 6. Lotemax drops 1 to right eye daily. 7. Advair 1 puff twice daily. 8. Alphagan 1 drop both eyes 3 times daily. 9. AzaSite 1 drop left eye nightly. 10. Azopt 1 drop both eyes 3 times daily. 11. Antifungal cream to affected area twice daily. 12. BuSpar 15 mg twice daily for anxiety. 13. Durezol 1 drop right eye every 7 days. 14. Durezol 1 drop left eye 3 times daily. 15. Lidex external solution to affected area twice daily.   16. Mineral oil 1 drop both eyes 4 times daily. 17. Lumigan 1 drop right eye nightly. 18. Melatonin 5 mg at night. 19. Aleksandra 1 drop left eye twice daily. 20. Milk of magnesia 30 mL daily as needed for constipation. 21. Senna 1 tab every other day. 22. Fleets enema as needed into the rectum daily. 23. Spiriva 1 cap inhalation daily. 24. Tylenol Arthritis pain medication 650 mg every 8 hours as needed. So, the main course of her regimen is finishing the antibiotic treatment  and continuing her eyedrop regimen as well as long as she can  tolerate this. CODE STATUS: She has a DO NOT RESUSCITATE. DISCHARGE CONDITION: She is stable for discharge today. I confirmed the plan with her daughter, Joselo Gonzalez, yesterday. She is going to  hospice at [facility]. TIME SPENT: 45 minutes on discharge time.         MD ANDRA Cleaning / Elizabeth Cano  D:  02/02/2017   09:29  T:  02/02/2017   10:01  Job #:  [id number]

## 2017-02-02 NOTE — ROUTINE PROCESS
TRANSFER - OUT REPORT:    Verbal report given to [facility]) on [facility]  being transferred to [facility](unit) for routine progression of care       Report consisted of patients Situation, Background, Assessment and   Recommendations(SBAR). Information from the following report(s) SBAR, Procedure Summary, Intake/Output, MAR, Recent Results and Med Rec Status was reviewed with the receiving nurse. Lines:       Opportunity for questions and clarification was provided.       Patient transported with:   O2 @ 4 liters

## 2017-02-02 NOTE — PROGRESS NOTES
Reviewed chart and noted discharge order for pt to return with hospice care to [facility], [address], [place], [address] [facility]. Nurse to call report to:  [phone]. Nurse, please send [facility], [place] summary and any scripts for controlled substances with pt. CM placed call to pt's daughter, Jenn Gibbons, [phone] to inform her of the above. Unit clerk working on stretcher transport time now. CM will notify [facility] of stretcher transport time when known. CM reached daughter Jenn Gibbons at [phone] and informed her of the pt's stretcher transport time of 2pm today and that [facility] has been notified to follow-up with her when pt returns to [place] [address] and daughter was in agreement with the above plan. CM left message with Leo Quick at [facility] to inform [place]/hospice nurse of the transport time. Care Management Interventions  PCP Verified by CM: Yes  Mode of Transport at Discharge:  Other (see comment)  Transition of Care Consult (CM Consult): Discharge Planning, Home Hospice (at [place] [address])  [address]: No  Reason [facility]: Unable to staff case  MyChart Signup: No  Discharge Durable Medical Equipment: No  Health Maintenance Reviewed: Yes  Physical Therapy Consult: Yes  Occupational Therapy Consult: No  Speech Therapy Consult: Yes  Current Support Network: Nursing Facility  Confirm Follow Up Transport: Other (see comment)  Plan discussed with Pt/Family/Caregiver: Yes  Freedom of Choice Offered: Yes ([facility], [facility])  Discharge Location  Discharge Placement: Skilled nursing facility [facility] [place] [facility])

## 2017-02-02 NOTE — PROGRESS NOTES
Shift Summary :  Awake at intervals. On several rounds sleeping . Easily awakes and yells out for nurse. Call bell at bedside. Ice chips prn. PICC l;ine operational. No signs of distress.

## 2017-02-02 NOTE — PROGRESS NOTES
0730 - Bedside report received from [facility], RN. Patient A&O x 4. Resting comfortably with no c/o pain, 4L NC, NO distress noted. Plan of care for today is DISCHARGE TO SNF. Patient Vitals for the past 12 hrs:   Temp Pulse Resp BP SpO2   02/02/17 1029 97.9 °F (36.6 °C) 72 16 145/87 95 %   02/02/17 0700 98.6 °F (37 °C) 64 16 176/89 97 %   02/02/17 0420 98.2 °F (36.8 °C) 62 18 133/64 96 %     Patient discharged to SNF. SBAR given [phone]. Transport arrived 1400.

## 2017-02-02 NOTE — ROUTINE PROCESS
Bedside and Verbal shift change report given to REJI Abrams RN (oncoming nurse) by Tulio Benitez   (offgoing nurse). Report included the following information SBAR, Intake/Output and MAR.        Pt had uneventful shift and tolerated medications well

## 2017-02-02 NOTE — ROUTINE PROCESS
Bedside and Verbal shift change report given to TERRI Tam RN  (oncoming nurse) by  REJI Buitrago RN  (offgoing nurse). Report included the following information SBAR, Kardex, Recent Results and Med Rec Status.

## 2017-02-04 LAB
BACTERIA SPEC CULT: NORMAL
BACTERIA SPEC CULT: NORMAL
SERVICE CMNT-IMP: NORMAL
SERVICE CMNT-IMP: NORMAL
